# Patient Record
Sex: FEMALE | Race: BLACK OR AFRICAN AMERICAN | Employment: OTHER | URBAN - METROPOLITAN AREA
[De-identification: names, ages, dates, MRNs, and addresses within clinical notes are randomized per-mention and may not be internally consistent; named-entity substitution may affect disease eponyms.]

---

## 2017-01-22 RX ORDER — FUROSEMIDE 40 MG/1
TABLET ORAL
Qty: 30 TAB | Refills: 0 | Status: SHIPPED | OUTPATIENT
Start: 2017-01-22 | End: 2017-02-18 | Stop reason: SDUPTHER

## 2017-02-20 RX ORDER — FUROSEMIDE 40 MG/1
TABLET ORAL
Qty: 30 TAB | Refills: 0 | Status: SHIPPED | OUTPATIENT
Start: 2017-02-20 | End: 2017-03-20 | Stop reason: SDUPTHER

## 2017-02-26 RX ORDER — BLOOD SUGAR DIAGNOSTIC
STRIP MISCELLANEOUS
Qty: 100 STRIP | Refills: 9 | Status: SHIPPED | OUTPATIENT
Start: 2017-02-26 | End: 2018-04-01 | Stop reason: SDUPTHER

## 2017-03-20 RX ORDER — FUROSEMIDE 40 MG/1
TABLET ORAL
Qty: 30 TAB | Refills: 0 | Status: SHIPPED | OUTPATIENT
Start: 2017-03-20 | End: 2017-04-17 | Stop reason: SDUPTHER

## 2017-04-17 RX ORDER — FUROSEMIDE 40 MG/1
TABLET ORAL
Qty: 90 TAB | Refills: 3 | Status: SHIPPED | OUTPATIENT
Start: 2017-04-17 | End: 2017-04-18 | Stop reason: SDUPTHER

## 2017-04-17 NOTE — TELEPHONE ENCOUNTER
Requested Prescriptions     Pending Prescriptions Disp Refills    furosemide (LASIX) 40 mg tablet 90 Tab 3     Sig: TAKE ONE TABLET BY MOUTH DAILY     90 day supply requested by pharmacy

## 2017-04-18 RX ORDER — FUROSEMIDE 40 MG/1
TABLET ORAL
Qty: 30 TAB | Refills: 0 | Status: SHIPPED | OUTPATIENT
Start: 2017-04-18 | End: 2018-05-08 | Stop reason: SDUPTHER

## 2017-05-12 RX ORDER — DILTIAZEM HYDROCHLORIDE 120 MG/1
CAPSULE, EXTENDED RELEASE ORAL
Qty: 90 CAP | Refills: 2 | Status: SHIPPED | OUTPATIENT
Start: 2017-05-12 | End: 2018-02-03 | Stop reason: SDUPTHER

## 2017-06-26 RX ORDER — GLIPIZIDE 5 MG/1
TABLET ORAL
Qty: 180 TAB | Refills: 0 | Status: SHIPPED | OUTPATIENT
Start: 2017-06-26 | End: 2017-09-22 | Stop reason: SDUPTHER

## 2017-09-22 RX ORDER — GLIPIZIDE 5 MG/1
TABLET ORAL
Qty: 180 TAB | Refills: 0 | Status: SHIPPED | OUTPATIENT
Start: 2017-09-22 | End: 2017-12-18 | Stop reason: SDUPTHER

## 2017-10-31 ENCOUNTER — CLINICAL SUPPORT (OUTPATIENT)
Dept: INTERNAL MEDICINE CLINIC | Age: 82
End: 2017-10-31

## 2017-10-31 ENCOUNTER — LAB ONLY (OUTPATIENT)
Dept: INTERNAL MEDICINE CLINIC | Age: 82
End: 2017-10-31

## 2017-10-31 ENCOUNTER — HOSPITAL ENCOUNTER (OUTPATIENT)
Dept: LAB | Age: 82
Discharge: HOME | End: 2017-10-31
Payer: MEDICARE

## 2017-10-31 DIAGNOSIS — E11.9 TYPE 2 DIABETES MELLITUS WITHOUT COMPLICATION, WITHOUT LONG-TERM CURRENT USE OF INSULIN (HCC): Primary | Chronic | ICD-10-CM

## 2017-10-31 DIAGNOSIS — Z23 ENCOUNTER FOR IMMUNIZATION: ICD-10-CM

## 2017-10-31 DIAGNOSIS — I10 ESSENTIAL HYPERTENSION: Chronic | ICD-10-CM

## 2017-10-31 DIAGNOSIS — E11.9 TYPE 2 DIABETES MELLITUS WITHOUT COMPLICATION, WITHOUT LONG-TERM CURRENT USE OF INSULIN (HCC): Chronic | ICD-10-CM

## 2017-10-31 LAB
ALBUMIN SERPL-MCNC: 3.4 G/DL (ref 3.4–5)
ALBUMIN/GLOB SERPL: 0.8 {RATIO} (ref 0.8–1.7)
ALP SERPL-CCNC: 154 U/L (ref 45–117)
ALT SERPL-CCNC: 17 U/L (ref 13–56)
ANION GAP SERPL CALC-SCNC: 8 MMOL/L (ref 3–18)
AST SERPL-CCNC: 12 U/L (ref 15–37)
BASOPHILS # BLD: 0 K/UL (ref 0–0.06)
BASOPHILS NFR BLD: 0 % (ref 0–2)
BILIRUB SERPL-MCNC: 0.3 MG/DL (ref 0.2–1)
BUN SERPL-MCNC: 18 MG/DL (ref 7–18)
BUN/CREAT SERPL: 16 (ref 12–20)
CALCIUM SERPL-MCNC: 9 MG/DL (ref 8.5–10.1)
CHLORIDE SERPL-SCNC: 101 MMOL/L (ref 100–108)
CO2 SERPL-SCNC: 29 MMOL/L (ref 21–32)
CREAT SERPL-MCNC: 1.1 MG/DL (ref 0.6–1.3)
DIFFERENTIAL METHOD BLD: ABNORMAL
EOSINOPHIL # BLD: 0.1 K/UL (ref 0–0.4)
EOSINOPHIL NFR BLD: 2 % (ref 0–5)
ERYTHROCYTE [DISTWIDTH] IN BLOOD BY AUTOMATED COUNT: 14.6 % (ref 11.6–14.5)
EST. AVERAGE GLUCOSE BLD GHB EST-MCNC: 197 MG/DL
GLOBULIN SER CALC-MCNC: 4.4 G/DL (ref 2–4)
GLUCOSE SERPL-MCNC: 184 MG/DL (ref 74–99)
HBA1C MFR BLD: 8.5 % (ref 4.2–5.6)
HCT VFR BLD AUTO: 38.2 % (ref 35–45)
HGB BLD-MCNC: 11.9 G/DL (ref 12–16)
LYMPHOCYTES # BLD: 2.3 K/UL (ref 0.9–3.6)
LYMPHOCYTES NFR BLD: 27 % (ref 21–52)
MCH RBC QN AUTO: 29 PG (ref 24–34)
MCHC RBC AUTO-ENTMCNC: 31.2 G/DL (ref 31–37)
MCV RBC AUTO: 92.9 FL (ref 74–97)
MONOCYTES # BLD: 0.4 K/UL (ref 0.05–1.2)
MONOCYTES NFR BLD: 5 % (ref 3–10)
NEUTS SEG # BLD: 5.6 K/UL (ref 1.8–8)
NEUTS SEG NFR BLD: 66 % (ref 40–73)
PLATELET # BLD AUTO: 288 K/UL (ref 135–420)
PMV BLD AUTO: 11.2 FL (ref 9.2–11.8)
POTASSIUM SERPL-SCNC: 4.2 MMOL/L (ref 3.5–5.5)
PROT SERPL-MCNC: 7.8 G/DL (ref 6.4–8.2)
RBC # BLD AUTO: 4.11 M/UL (ref 4.2–5.3)
SODIUM SERPL-SCNC: 138 MMOL/L (ref 136–145)
WBC # BLD AUTO: 8.5 K/UL (ref 4.6–13.2)

## 2017-10-31 PROCEDURE — 80053 COMPREHEN METABOLIC PANEL: CPT | Performed by: INTERNAL MEDICINE

## 2017-10-31 PROCEDURE — 83036 HEMOGLOBIN GLYCOSYLATED A1C: CPT | Performed by: INTERNAL MEDICINE

## 2017-10-31 PROCEDURE — 85025 COMPLETE CBC W/AUTO DIFF WBC: CPT | Performed by: INTERNAL MEDICINE

## 2017-11-18 ENCOUNTER — TELEPHONE (OUTPATIENT)
Dept: INTERNAL MEDICINE CLINIC | Age: 82
End: 2017-11-18

## 2017-11-18 RX ORDER — CIPROFLOXACIN 250 MG/1
TABLET, FILM COATED ORAL
Qty: 10 TAB | Refills: 0 | Status: SHIPPED | OUTPATIENT
Start: 2017-11-18 | End: 2022-06-23 | Stop reason: ALTCHOICE

## 2017-12-18 RX ORDER — GLIPIZIDE 5 MG/1
TABLET ORAL
Qty: 180 TAB | Refills: 0 | Status: SHIPPED | OUTPATIENT
Start: 2017-12-18 | End: 2018-03-14 | Stop reason: SDUPTHER

## 2018-02-03 RX ORDER — DILTIAZEM HYDROCHLORIDE 120 MG/1
CAPSULE, EXTENDED RELEASE ORAL
Qty: 90 CAP | Refills: 1 | Status: SHIPPED | OUTPATIENT
Start: 2018-02-03 | End: 2018-08-02 | Stop reason: SDUPTHER

## 2018-03-14 RX ORDER — GLIPIZIDE 5 MG/1
TABLET ORAL
Qty: 180 TAB | Refills: 0 | Status: SHIPPED | OUTPATIENT
Start: 2018-03-14 | End: 2018-06-10 | Stop reason: SDUPTHER

## 2018-04-01 RX ORDER — BLOOD SUGAR DIAGNOSTIC
STRIP MISCELLANEOUS
Qty: 100 STRIP | Refills: 8 | Status: SHIPPED | OUTPATIENT
Start: 2018-04-01

## 2018-05-07 ENCOUNTER — TELEPHONE (OUTPATIENT)
Dept: INTERNAL MEDICINE CLINIC | Age: 83
End: 2018-05-07

## 2018-05-07 NOTE — TELEPHONE ENCOUNTER
Patient has a tooth abscess and would like for Dr Rikki Quevedo to send in antibiotic for her. He usually does home visits for her. Please send to [facility] on [address].

## 2018-05-08 RX ORDER — FUROSEMIDE 40 MG/1
TABLET ORAL
Qty: 30 TAB | Refills: 0 | Status: SHIPPED | OUTPATIENT
Start: 2018-05-08 | End: 2018-06-03 | Stop reason: SDUPTHER

## 2018-05-08 RX ORDER — AMOXICILLIN 500 MG/1
500 CAPSULE ORAL 3 TIMES DAILY
Qty: 30 CAP | Refills: 0 | Status: SHIPPED | OUTPATIENT
Start: 2018-05-08 | End: 2018-05-18

## 2018-06-03 RX ORDER — FUROSEMIDE 40 MG/1
TABLET ORAL
Qty: 30 TAB | Refills: 0 | Status: SHIPPED | OUTPATIENT
Start: 2018-06-03 | End: 2018-07-03 | Stop reason: SDUPTHER

## 2018-06-10 RX ORDER — GLIPIZIDE 5 MG/1
TABLET ORAL
Qty: 180 TAB | Refills: 0 | Status: SHIPPED | OUTPATIENT
Start: 2018-06-10 | End: 2018-09-03 | Stop reason: SDUPTHER

## 2018-07-03 RX ORDER — FUROSEMIDE 40 MG/1
TABLET ORAL
Qty: 30 TAB | Refills: 0 | Status: SHIPPED | OUTPATIENT
Start: 2018-07-03 | End: 2018-07-29 | Stop reason: SDUPTHER

## 2018-07-29 RX ORDER — FUROSEMIDE 40 MG/1
TABLET ORAL
Qty: 30 TAB | Refills: 0 | Status: SHIPPED | OUTPATIENT
Start: 2018-07-29 | End: 2018-08-29 | Stop reason: SDUPTHER

## 2018-08-02 RX ORDER — DILTIAZEM HYDROCHLORIDE 120 MG/1
CAPSULE, EXTENDED RELEASE ORAL
Qty: 90 CAP | Refills: 0 | Status: SHIPPED | OUTPATIENT
Start: 2018-08-02 | End: 2018-10-28 | Stop reason: SDUPTHER

## 2018-08-29 RX ORDER — FUROSEMIDE 40 MG/1
TABLET ORAL
Qty: 30 TAB | Refills: 0 | Status: SHIPPED | OUTPATIENT
Start: 2018-08-29 | End: 2018-09-25 | Stop reason: SDUPTHER

## 2018-09-03 RX ORDER — GLIPIZIDE 5 MG/1
TABLET ORAL
Qty: 180 TAB | Refills: 0 | Status: SHIPPED | OUTPATIENT
Start: 2018-09-03 | End: 2018-11-05 | Stop reason: SDUPTHER

## 2018-09-10 RX ORDER — METFORMIN HYDROCHLORIDE 750 MG/1
TABLET, EXTENDED RELEASE ORAL
Qty: 30 TAB | Refills: 3 | Status: SHIPPED | OUTPATIENT
Start: 2018-09-10 | End: 2019-05-06 | Stop reason: SDUPTHER

## 2018-09-13 RX ORDER — LANCETS
EACH MISCELLANEOUS
Qty: 100 EACH | Refills: 1 | Status: SHIPPED | OUTPATIENT
Start: 2018-09-13

## 2018-10-01 RX ORDER — FUROSEMIDE 40 MG/1
TABLET ORAL
Qty: 30 TAB | Refills: 0 | Status: SHIPPED | OUTPATIENT
Start: 2018-10-01 | End: 2018-10-27 | Stop reason: SDUPTHER

## 2018-10-28 RX ORDER — FUROSEMIDE 40 MG/1
TABLET ORAL
Qty: 30 TAB | Refills: 0 | Status: SHIPPED | OUTPATIENT
Start: 2018-10-28 | End: 2018-11-05 | Stop reason: SDUPTHER

## 2018-10-29 RX ORDER — DILTIAZEM HYDROCHLORIDE 120 MG/1
CAPSULE, EXTENDED RELEASE ORAL
Qty: 90 CAP | Refills: 0 | Status: SHIPPED | OUTPATIENT
Start: 2018-10-29 | End: 2019-01-04 | Stop reason: SDUPTHER

## 2018-11-01 ENCOUNTER — TELEPHONE (OUTPATIENT)
Dept: INTERNAL MEDICINE CLINIC | Age: 83
End: 2018-11-01

## 2018-11-01 NOTE — TELEPHONE ENCOUNTER
Patient stated Dr Eli Avila was going to come give her a flu shot. She was wondering when ?  Please call [phone]

## 2018-11-06 RX ORDER — GLIPIZIDE 5 MG/1
TABLET ORAL
Qty: 180 TAB | Refills: 0 | Status: SHIPPED | OUTPATIENT
Start: 2018-11-06 | End: 2019-02-03 | Stop reason: SDUPTHER

## 2018-11-06 RX ORDER — FUROSEMIDE 40 MG/1
TABLET ORAL
Qty: 9 TAB | Refills: 0 | Status: SHIPPED | OUTPATIENT
Start: 2018-11-06 | End: 2018-12-26 | Stop reason: SDUPTHER

## 2018-11-06 NOTE — TELEPHONE ENCOUNTER
Patient called again and wanted to know when Dr. Phillip Musa was coming to give her a flu injection. Dr. Phillip Musa stated he will try and go on Wednesday. Patient was told that he will try to come on Wednesday.

## 2018-11-07 ENCOUNTER — CLINICAL SUPPORT (OUTPATIENT)
Dept: INTERNAL MEDICINE CLINIC | Age: 83
End: 2018-11-07

## 2018-11-07 ENCOUNTER — HOSPITAL ENCOUNTER (OUTPATIENT)
Dept: LAB | Age: 83
Discharge: HOME | End: 2018-11-07
Payer: MEDICARE

## 2018-11-07 DIAGNOSIS — E11.9 TYPE 2 DIABETES MELLITUS WITHOUT COMPLICATION, WITHOUT LONG-TERM CURRENT USE OF INSULIN (HCC): Chronic | ICD-10-CM

## 2018-11-07 DIAGNOSIS — Z23 ENCOUNTER FOR IMMUNIZATION: ICD-10-CM

## 2018-11-07 DIAGNOSIS — I10 ESSENTIAL HYPERTENSION: Chronic | ICD-10-CM

## 2018-11-07 DIAGNOSIS — I10 ESSENTIAL HYPERTENSION: Primary | Chronic | ICD-10-CM

## 2018-11-07 LAB
ALBUMIN SERPL-MCNC: 3.7 G/DL (ref 3.4–5)
ALBUMIN/GLOB SERPL: 0.8 {RATIO} (ref 0.8–1.7)
ALP SERPL-CCNC: 105 U/L (ref 45–117)
ALT SERPL-CCNC: 19 U/L (ref 13–56)
ANION GAP SERPL CALC-SCNC: 7 MMOL/L (ref 3–18)
AST SERPL-CCNC: 14 U/L (ref 15–37)
BASOPHILS # BLD: 0 K/UL (ref 0–0.1)
BASOPHILS NFR BLD: 0 % (ref 0–2)
BILIRUB SERPL-MCNC: 0.6 MG/DL (ref 0.2–1)
BUN SERPL-MCNC: 13 MG/DL (ref 7–18)
BUN/CREAT SERPL: 12 (ref 12–20)
CALCIUM SERPL-MCNC: 9 MG/DL (ref 8.5–10.1)
CHLORIDE SERPL-SCNC: 105 MMOL/L (ref 100–108)
CO2 SERPL-SCNC: 28 MMOL/L (ref 21–32)
CREAT SERPL-MCNC: 1.05 MG/DL (ref 0.6–1.3)
DIFFERENTIAL METHOD BLD: ABNORMAL
EOSINOPHIL # BLD: 0.1 K/UL (ref 0–0.4)
EOSINOPHIL NFR BLD: 1 % (ref 0–5)
ERYTHROCYTE [DISTWIDTH] IN BLOOD BY AUTOMATED COUNT: 14.4 % (ref 11.6–14.5)
EST. AVERAGE GLUCOSE BLD GHB EST-MCNC: 214 MG/DL
GLOBULIN SER CALC-MCNC: 4.6 G/DL (ref 2–4)
GLUCOSE SERPL-MCNC: 168 MG/DL (ref 74–99)
HBA1C MFR BLD: 9.1 % (ref 4.2–5.6)
HCT VFR BLD AUTO: 37.6 % (ref 35–45)
HGB BLD-MCNC: 12.2 G/DL (ref 12–16)
LYMPHOCYTES # BLD: 3 K/UL (ref 0.9–3.6)
LYMPHOCYTES NFR BLD: 29 % (ref 21–52)
MCH RBC QN AUTO: 29.2 PG (ref 24–34)
MCHC RBC AUTO-ENTMCNC: 32.4 G/DL (ref 31–37)
MCV RBC AUTO: 90 FL (ref 74–97)
MONOCYTES # BLD: 0.5 K/UL (ref 0.05–1.2)
MONOCYTES NFR BLD: 5 % (ref 3–10)
NEUTS SEG # BLD: 6.5 K/UL (ref 1.8–8)
NEUTS SEG NFR BLD: 65 % (ref 40–73)
PLATELET # BLD AUTO: 339 K/UL (ref 135–420)
PMV BLD AUTO: 11.3 FL (ref 9.2–11.8)
POTASSIUM SERPL-SCNC: 4 MMOL/L (ref 3.5–5.5)
PROT SERPL-MCNC: 8.3 G/DL (ref 6.4–8.2)
RBC # BLD AUTO: 4.18 M/UL (ref 4.2–5.3)
SODIUM SERPL-SCNC: 140 MMOL/L (ref 136–145)
WBC # BLD AUTO: 10.1 K/UL (ref 4.6–13.2)

## 2018-11-07 PROCEDURE — 85025 COMPLETE CBC W/AUTO DIFF WBC: CPT | Performed by: INTERNAL MEDICINE

## 2018-11-07 PROCEDURE — 80053 COMPREHEN METABOLIC PANEL: CPT | Performed by: INTERNAL MEDICINE

## 2018-11-07 PROCEDURE — 83036 HEMOGLOBIN GLYCOSYLATED A1C: CPT | Performed by: INTERNAL MEDICINE

## 2018-11-07 NOTE — PROGRESS NOTES
Patient presented to office for Influenza 0.5 ml injection. Allergies noted. Patient well and consenting to injection. Injection given intramuscular in left deltoid. Patient tolerated injection well.

## 2018-12-27 RX ORDER — FUROSEMIDE 40 MG/1
TABLET ORAL
Qty: 90 TAB | Refills: 0 | Status: SHIPPED | OUTPATIENT
Start: 2018-12-27 | End: 2019-01-04 | Stop reason: SDUPTHER

## 2019-01-04 RX ORDER — FUROSEMIDE 40 MG/1
TABLET ORAL
Qty: 9 TAB | Refills: 0 | Status: SHIPPED | OUTPATIENT
Start: 2019-01-04 | End: 2022-08-04 | Stop reason: ALTCHOICE

## 2019-01-04 RX ORDER — DILTIAZEM HYDROCHLORIDE 120 MG/1
CAPSULE, EXTENDED RELEASE ORAL
Qty: 68 CAP | Refills: 0 | Status: SHIPPED | OUTPATIENT
Start: 2019-01-04 | End: 2019-03-09 | Stop reason: SDUPTHER

## 2019-02-03 RX ORDER — GLIPIZIDE 5 MG/1
TABLET ORAL
Qty: 180 TAB | Refills: 0 | Status: SHIPPED | OUTPATIENT
Start: 2019-02-03 | End: 2019-05-06 | Stop reason: SDUPTHER

## 2019-03-09 RX ORDER — DILTIAZEM HYDROCHLORIDE 120 MG/1
CAPSULE, EXTENDED RELEASE ORAL
Qty: 68 CAP | Refills: 0 | Status: SHIPPED | OUTPATIENT
Start: 2019-03-09 | End: 2019-05-06 | Stop reason: SDUPTHER

## 2019-03-26 RX ORDER — FUROSEMIDE 40 MG/1
TABLET ORAL
Qty: 90 TAB | Refills: 0 | Status: SHIPPED | OUTPATIENT
Start: 2019-03-26 | End: 2019-07-01 | Stop reason: SDUPTHER

## 2019-05-06 RX ORDER — GLIPIZIDE 5 MG/1
TABLET ORAL
Qty: 180 TAB | Refills: 0 | Status: SHIPPED | OUTPATIENT
Start: 2019-05-06 | End: 2019-08-16 | Stop reason: SDUPTHER

## 2019-05-06 RX ORDER — METFORMIN HYDROCHLORIDE 750 MG/1
TABLET, EXTENDED RELEASE ORAL
Qty: 90 TAB | Refills: 0 | Status: SHIPPED | OUTPATIENT
Start: 2019-05-06 | End: 2019-08-05 | Stop reason: SDUPTHER

## 2019-05-06 RX ORDER — DILTIAZEM HYDROCHLORIDE 120 MG/1
CAPSULE, EXTENDED RELEASE ORAL
Qty: 68 CAP | Refills: 0 | Status: SHIPPED | OUTPATIENT
Start: 2019-05-06 | End: 2019-07-14 | Stop reason: SDUPTHER

## 2019-06-07 ENCOUNTER — TELEPHONE (OUTPATIENT)
Dept: FAMILY MEDICINE CLINIC | Facility: CLINIC | Age: 84
End: 2019-06-07

## 2019-06-07 RX ORDER — AMOXICILLIN 500 MG/1
500 CAPSULE ORAL 3 TIMES DAILY
Qty: 30 CAP | Refills: 0 | Status: SHIPPED | OUTPATIENT
Start: 2019-06-07 | End: 2019-06-17

## 2019-07-01 RX ORDER — FUROSEMIDE 40 MG/1
TABLET ORAL
Qty: 90 TAB | Refills: 0 | Status: SHIPPED | OUTPATIENT
Start: 2019-07-01 | End: 2019-09-30 | Stop reason: SDUPTHER

## 2019-07-15 RX ORDER — DILTIAZEM HYDROCHLORIDE 120 MG/1
CAPSULE, EXTENDED RELEASE ORAL
Qty: 68 CAP | Refills: 0 | Status: SHIPPED | OUTPATIENT
Start: 2019-07-15 | End: 2019-09-30 | Stop reason: SDUPTHER

## 2019-08-05 RX ORDER — METFORMIN HYDROCHLORIDE 750 MG/1
TABLET, EXTENDED RELEASE ORAL
Qty: 90 TAB | Refills: 0 | Status: SHIPPED | OUTPATIENT
Start: 2019-08-05 | End: 2019-09-30 | Stop reason: SDUPTHER

## 2019-08-18 RX ORDER — GLIPIZIDE 5 MG/1
TABLET ORAL
Qty: 180 TAB | Refills: 0 | Status: SHIPPED | OUTPATIENT
Start: 2019-08-18 | End: 2019-10-01 | Stop reason: SDUPTHER

## 2019-09-30 RX ORDER — FUROSEMIDE 40 MG/1
TABLET ORAL
Qty: 90 TAB | Refills: 0 | Status: SHIPPED | OUTPATIENT
Start: 2019-09-30 | End: 2019-12-26

## 2019-09-30 RX ORDER — DILTIAZEM HYDROCHLORIDE 120 MG/1
CAPSULE, EXTENDED RELEASE ORAL
Qty: 68 CAP | Refills: 0 | Status: SHIPPED | OUTPATIENT
Start: 2019-09-30 | End: 2022-06-23

## 2019-09-30 RX ORDER — METFORMIN HYDROCHLORIDE 750 MG/1
TABLET, EXTENDED RELEASE ORAL
Qty: 55 TAB | Refills: 0 | Status: SHIPPED | OUTPATIENT
Start: 2019-09-30 | End: 2020-01-22 | Stop reason: SDUPTHER

## 2019-10-01 RX ORDER — METFORMIN HYDROCHLORIDE 750 MG/1
TABLET, EXTENDED RELEASE ORAL
Qty: 55 TAB | Refills: 0 | Status: SHIPPED | OUTPATIENT
Start: 2019-10-01 | End: 2020-01-22

## 2019-10-01 RX ORDER — DILTIAZEM HYDROCHLORIDE 120 MG/1
CAPSULE, EXTENDED RELEASE ORAL
Qty: 68 CAP | Refills: 0 | Status: SHIPPED | OUTPATIENT
Start: 2019-10-01 | End: 2019-12-19 | Stop reason: SDUPTHER

## 2019-10-01 RX ORDER — GLIPIZIDE 5 MG/1
TABLET ORAL
Qty: 88 TAB | Refills: 0 | Status: SHIPPED | OUTPATIENT
Start: 2019-10-01 | End: 2019-12-24

## 2019-10-01 RX ORDER — FUROSEMIDE 40 MG/1
TABLET ORAL
Qty: 90 TAB | Refills: 0 | Status: SHIPPED | OUTPATIENT
Start: 2019-10-01 | End: 2022-06-23 | Stop reason: SDUPTHER

## 2019-10-30 ENCOUNTER — CLINICAL SUPPORT (OUTPATIENT)
Dept: FAMILY MEDICINE CLINIC | Facility: CLINIC | Age: 84
End: 2019-10-30

## 2019-10-30 DIAGNOSIS — Z23 ENCOUNTER FOR IMMUNIZATION: ICD-10-CM

## 2019-10-31 NOTE — PATIENT INSTRUCTIONS
Patient presented to office 10/30/19 for influenza adjuvanted 0.5 ml injection. Allergies noted. Patient well and consenting to injection. Injection given intramuscular in right deltoid. Patient tolerated injection well and left office ambulatory. Patient presented to office 10/30/19 for pneumococcal 13   0.5 ml injection. Allergies noted. Patient well and consenting to injection. Injection given intramuscular in left deltoid. Patient tolerated injection well and left office ambulatory.

## 2019-12-19 RX ORDER — DILTIAZEM HYDROCHLORIDE 120 MG/1
CAPSULE, COATED, EXTENDED RELEASE ORAL
Qty: 90 CAP | Refills: 1 | Status: SHIPPED | OUTPATIENT
Start: 2019-12-19 | End: 2022-08-04 | Stop reason: SDUPTHER

## 2019-12-24 RX ORDER — GLIPIZIDE 5 MG/1
TABLET ORAL
Qty: 88 TAB | Refills: 0 | Status: SHIPPED | OUTPATIENT
Start: 2019-12-24 | End: 2022-06-23 | Stop reason: ALTCHOICE

## 2019-12-26 RX ORDER — FUROSEMIDE 40 MG/1
TABLET ORAL
Qty: 90 TAB | Refills: 0 | Status: SHIPPED | OUTPATIENT
Start: 2019-12-26 | End: 2019-12-30 | Stop reason: SDUPTHER

## 2019-12-30 RX ORDER — FUROSEMIDE 40 MG/1
TABLET ORAL
Qty: 90 TAB | Refills: 1 | Status: SHIPPED | OUTPATIENT
Start: 2019-12-30 | End: 2022-06-23 | Stop reason: SDUPTHER

## 2019-12-30 NOTE — TELEPHONE ENCOUNTER
Patient states Dalton Lizama told her someone picked up prescription, which she says no one picked up. She is requesting new prescription. She will also contact her insurance.     Requested Prescriptions     Pending Prescriptions Disp Refills    furosemide (LASIX) 40 mg tablet 90 Tab 0

## 2020-02-26 RX ORDER — GLIPIZIDE 5 MG/1
TABLET ORAL
Qty: 180 TAB | Refills: 0 | OUTPATIENT
Start: 2020-02-26

## 2020-07-31 ENCOUNTER — HOSPITAL ENCOUNTER (OUTPATIENT)
Dept: LAB | Age: 85
Discharge: HOME | End: 2020-07-31
Payer: MEDICARE

## 2020-07-31 ENCOUNTER — HOSPITAL ENCOUNTER (OUTPATIENT)
Dept: LAB | Age: 85
Discharge: HOME | End: 2020-07-31

## 2020-07-31 DIAGNOSIS — I10 ESSENTIAL HYPERTENSION, MALIGNANT: ICD-10-CM

## 2020-07-31 DIAGNOSIS — E11.9 TYPE 2 DIABETES MELLITUS WITHOUT COMPLICATION, WITH LONG-TERM CURRENT USE OF INSULIN (HCC): ICD-10-CM

## 2020-07-31 DIAGNOSIS — I51.89 HYPERKINETIC HEART DISEASE: ICD-10-CM

## 2020-07-31 DIAGNOSIS — Z79.4 TYPE 2 DIABETES MELLITUS WITHOUT COMPLICATION, WITH LONG-TERM CURRENT USE OF INSULIN (HCC): ICD-10-CM

## 2020-07-31 LAB
ATRIAL RATE: 89 BPM
CALCULATED P AXIS, ECG09: 54 DEGREES
CALCULATED R AXIS, ECG10: -30 DEGREES
CALCULATED T AXIS, ECG11: 34 DEGREES
DIAGNOSIS, 93000: NORMAL
P-R INTERVAL, ECG05: 172 MS
Q-T INTERVAL, ECG07: 356 MS
QRS DURATION, ECG06: 96 MS
QTC CALCULATION (BEZET), ECG08: 433 MS
VENTRICULAR RATE, ECG03: 89 BPM
XX-LABCORP SPECIMEN COL,LCBCF: NORMAL

## 2020-07-31 PROCEDURE — 93005 ELECTROCARDIOGRAM TRACING: CPT

## 2020-07-31 PROCEDURE — 99001 SPECIMEN HANDLING PT-LAB: CPT

## 2020-09-04 ENCOUNTER — HOSPITAL ENCOUNTER (OUTPATIENT)
Dept: NON INVASIVE DIAGNOSTICS | Age: 85
Discharge: HOME | End: 2020-09-04
Attending: FAMILY MEDICINE
Payer: MEDICARE

## 2020-09-04 ENCOUNTER — HOSPITAL ENCOUNTER (OUTPATIENT)
Dept: LAB | Age: 85
Discharge: HOME | End: 2020-09-04
Payer: MEDICARE

## 2020-09-04 VITALS
HEIGHT: 66 IN | DIASTOLIC BLOOD PRESSURE: 82 MMHG | BODY MASS INDEX: 47.09 KG/M2 | WEIGHT: 293 LBS | SYSTOLIC BLOOD PRESSURE: 148 MMHG

## 2020-09-04 DIAGNOSIS — I51.89 DIASTOLIC DYSFUNCTION: ICD-10-CM

## 2020-09-04 DIAGNOSIS — I10 HTN (HYPERTENSION): ICD-10-CM

## 2020-09-04 LAB
ANION GAP SERPL CALC-SCNC: 7 MMOL/L (ref 3–18)
BUN SERPL-MCNC: 14 MG/DL (ref 7–18)
BUN/CREAT SERPL: 13 (ref 12–20)
CALCIUM SERPL-MCNC: 9.6 MG/DL (ref 8.5–10.1)
CHLORIDE SERPL-SCNC: 103 MMOL/L (ref 100–111)
CO2 SERPL-SCNC: 27 MMOL/L (ref 21–32)
CREAT SERPL-MCNC: 1.06 MG/DL (ref 0.6–1.3)
ECHO AO ROOT DIAM: 2.72 CM
ECHO LV INTERNAL DIMENSION DIASTOLIC: 4.39 CM (ref 3.9–5.3)
ECHO LV INTERNAL DIMENSION SYSTOLIC: 3.88 CM
ECHO LV IVSD: 1.09 CM (ref 0.6–0.9)
ECHO LV MASS 2D: 156.6 G (ref 67–162)
ECHO LV MASS INDEX 2D: 63.7 G/M2 (ref 43–95)
ECHO LV POSTERIOR WALL DIASTOLIC: 1 CM (ref 0.6–0.9)
ECHO LVOT DIAM: 2.03 CM
ECHO LVOT PEAK GRADIENT: 1.66 MMHG
ECHO LVOT PEAK VELOCITY: 64.47 CM/S
ECHO LVOT SV: 35.3 ML
ECHO LVOT VTI: 10.95 CM
ECHO MV A VELOCITY: 97.46 CM/S
ECHO MV E DECELERATION TIME (DT): 0.12 S
ECHO MV E VELOCITY: 59.71 CM/S
ECHO MV E/A RATIO: 0.61
ECHO TV REGURGITANT MAX VELOCITY: 268.12 CM/S
ECHO TV REGURGITANT PEAK GRADIENT: 28.76 MMHG
GLUCOSE SERPL-MCNC: 148 MG/DL (ref 74–99)
LVOT MG: 0.68 MMHG
POTASSIUM SERPL-SCNC: 3.9 MMOL/L (ref 3.5–5.5)
SODIUM SERPL-SCNC: 137 MMOL/L (ref 136–145)

## 2020-09-04 PROCEDURE — 80048 BASIC METABOLIC PNL TOTAL CA: CPT

## 2020-09-04 PROCEDURE — 36415 COLL VENOUS BLD VENIPUNCTURE: CPT

## 2020-09-04 PROCEDURE — 93306 TTE W/DOPPLER COMPLETE: CPT

## 2021-05-16 NOTE — TELEPHONE ENCOUNTER
Patient has appointment 4/3.     Requested Prescriptions     Pending Prescriptions Disp Refills    glipiZIDE (GLUCOTROL) 5 mg tablet 88 Tab 0 None

## 2021-07-24 ENCOUNTER — HOSPITAL ENCOUNTER (OUTPATIENT)
Dept: LAB | Age: 86
Discharge: HOME | End: 2021-07-24

## 2021-07-24 LAB — XX-LABCORP SPECIMEN COL,LCBCF: NORMAL

## 2021-07-24 PROCEDURE — 99001 SPECIMEN HANDLING PT-LAB: CPT

## 2022-01-26 ENCOUNTER — TRANSCRIBE ORDER (OUTPATIENT)
Dept: SCHEDULING | Age: 87
End: 2022-01-26

## 2022-01-26 DIAGNOSIS — Z91.81 PERSONAL HISTORY OF FALL: Primary | ICD-10-CM

## 2022-02-17 ENCOUNTER — TRANSCRIBE ORDER (OUTPATIENT)
Dept: SCHEDULING | Age: 87
End: 2022-02-17

## 2022-02-17 DIAGNOSIS — M85.88 OTHER SPECIFIED DISORDERS OF BONE DENSITY AND STRUCTURE, OTHER SITE: Primary | ICD-10-CM

## 2022-05-09 ENCOUNTER — HOSPITAL ENCOUNTER (OUTPATIENT)
Dept: LAB | Age: 87
Discharge: HOME | End: 2022-05-09

## 2022-05-09 PROCEDURE — 99001 SPECIMEN HANDLING PT-LAB: CPT

## 2022-05-14 LAB — XX-LABCORP SPECIMEN COL,LCBCF: NORMAL

## 2022-05-17 ENCOUNTER — TELEPHONE (OUTPATIENT)
Dept: INTERNAL MEDICINE CLINIC | Age: 87
End: 2022-05-17

## 2022-05-17 NOTE — TELEPHONE ENCOUNTER
----- Message from [employer] sent at 2022  4:28 PM EDT -----  Subject: Appointment Request    Reason for Call: New Patient Request Appointment    QUESTIONS  Type of Appointment? Established Patient  Reason for appointment request? No appointments available during search  Additional Information for Provider? pt needs to be scheduled  ---------------------------------------------------------------------------  --------------  CALL BACK INFO  What is the best way for the office to contact you? OK to leave message on   voicemail  Preferred Call Back Phone Number? [phone]  ---------------------------------------------------------------------------  --------------  SCRIPT ANSWERS  Relationship to Patient? Self  Specialty Confirmation? Primary Care  Is this the first appointment to establish care for a ? No  Have you been diagnosed with, awaiting test results for, or told that you   are suspected of having COVID-19 (Coronavirus)? (If patient has tested   negative or was tested as a requirement for work, school, or travel and   not based on symptoms, answer no)? No  Within the past 10 days have you developed any of the following symptoms   (answer no if symptoms have been present longer than 10 days or began   more than 10 days ago)? Fever or Chills, Cough, Shortness of breath or   difficulty breathing, Loss of taste or smell, Sore throat, Nasal   congestion, Sneezing or runny nose, Fatigue or generalized body aches   (answer no if pain is specific to a body part e.g. back pain), Diarrhea,   Headache? No  Have you had close contact with someone with COVID-19 in the last 7 days? No  (Service Expert  click yes below to proceed with [employer] Hotline As Usual   Scheduling)?  Yes

## 2022-05-17 NOTE — TELEPHONE ENCOUNTER
Tried to call pt to schedule appt w/Dr. Cornelio Alvarado, no answer, left voice message for pt to call back.

## 2022-06-08 ENCOUNTER — TRANSCRIBE ORDER (OUTPATIENT)
Dept: SCHEDULING | Age: 87
End: 2022-06-08

## 2022-06-08 DIAGNOSIS — R41.82 ALTERED MENTAL STATUS: Primary | ICD-10-CM

## 2022-06-09 ENCOUNTER — TRANSCRIBE ORDER (OUTPATIENT)
Dept: SCHEDULING | Age: 87
End: 2022-06-09

## 2022-06-09 DIAGNOSIS — R41.82 ALTERED MENTAL STATUS: Primary | ICD-10-CM

## 2022-06-16 ENCOUNTER — TRANSCRIBE ORDER (OUTPATIENT)
Dept: SCHEDULING | Age: 87
End: 2022-06-16

## 2022-06-16 DIAGNOSIS — I50.32 CHRONIC DIASTOLIC HEART FAILURE (HCC): Primary | ICD-10-CM

## 2022-06-16 DIAGNOSIS — I50.32 CHRONIC DIASTOLIC HEART FAILURE WITH PRESERVED EJECTION FRACTION (HCC): Primary | ICD-10-CM

## 2022-06-23 ENCOUNTER — OFFICE VISIT (OUTPATIENT)
Dept: INTERNAL MEDICINE CLINIC | Age: 87
End: 2022-06-23
Payer: MEDICARE

## 2022-06-23 VITALS
OXYGEN SATURATION: 98 % | SYSTOLIC BLOOD PRESSURE: 133 MMHG | HEART RATE: 80 BPM | RESPIRATION RATE: 20 BRPM | HEIGHT: 66 IN | TEMPERATURE: 96.7 F | DIASTOLIC BLOOD PRESSURE: 74 MMHG | BODY MASS INDEX: 52.46 KG/M2

## 2022-06-23 DIAGNOSIS — R21 RASH: ICD-10-CM

## 2022-06-23 DIAGNOSIS — Z91.81 AT HIGH RISK FOR FALLS: ICD-10-CM

## 2022-06-23 DIAGNOSIS — I48.0 PAROXYSMAL A-FIB (HCC): ICD-10-CM

## 2022-06-23 DIAGNOSIS — Z78.9 IMPAIRED MOBILITY AND ADLS: ICD-10-CM

## 2022-06-23 DIAGNOSIS — F41.9 ANXIETY: ICD-10-CM

## 2022-06-23 DIAGNOSIS — N17.9 ACUTE RENAL FAILURE SUPERIMPOSED ON STAGE 3 CHRONIC KIDNEY DISEASE, UNSPECIFIED ACUTE RENAL FAILURE TYPE, UNSPECIFIED WHETHER STAGE 3A OR 3B CKD (HCC): ICD-10-CM

## 2022-06-23 DIAGNOSIS — R41.9 UNSPECIFIED SYMPTOMS AND SIGNS INVOLVING COGNITIVE FUNCTIONS AND AWARENESS: ICD-10-CM

## 2022-06-23 DIAGNOSIS — Z74.09 IMPAIRED MOBILITY AND ADLS: ICD-10-CM

## 2022-06-23 DIAGNOSIS — B37.2 CANDIDAL INTERTRIGO: ICD-10-CM

## 2022-06-23 DIAGNOSIS — E11.29 TYPE 2 DIABETES MELLITUS WITH MICROALBUMINURIA, WITHOUT LONG-TERM CURRENT USE OF INSULIN (HCC): ICD-10-CM

## 2022-06-23 DIAGNOSIS — R41.0 DELIRIUM: Primary | ICD-10-CM

## 2022-06-23 DIAGNOSIS — R80.9 TYPE 2 DIABETES MELLITUS WITH MICROALBUMINURIA, WITHOUT LONG-TERM CURRENT USE OF INSULIN (HCC): ICD-10-CM

## 2022-06-23 DIAGNOSIS — R53.81 PHYSICAL DECONDITIONING: ICD-10-CM

## 2022-06-23 DIAGNOSIS — E87.1 HYPONATREMIA: ICD-10-CM

## 2022-06-23 DIAGNOSIS — R54 FRAILTY SYNDROME IN GERIATRIC PATIENT: ICD-10-CM

## 2022-06-23 DIAGNOSIS — N18.30 ACUTE RENAL FAILURE SUPERIMPOSED ON STAGE 3 CHRONIC KIDNEY DISEASE, UNSPECIFIED ACUTE RENAL FAILURE TYPE, UNSPECIFIED WHETHER STAGE 3A OR 3B CKD (HCC): ICD-10-CM

## 2022-06-23 PROCEDURE — 93000 ELECTROCARDIOGRAM COMPLETE: CPT | Performed by: FAMILY MEDICINE

## 2022-06-23 PROCEDURE — 99215 OFFICE O/P EST HI 40 MIN: CPT | Performed by: FAMILY MEDICINE

## 2022-06-23 PROCEDURE — 1123F ACP DISCUSS/DSCN MKR DOCD: CPT | Performed by: FAMILY MEDICINE

## 2022-06-23 RX ORDER — SERTRALINE HYDROCHLORIDE 25 MG/1
25 TABLET, FILM COATED ORAL DAILY
Qty: 90 TABLET | Refills: 0 | Status: SHIPPED | OUTPATIENT
Start: 2022-06-23 | End: 2022-09-01 | Stop reason: SDUPTHER

## 2022-06-23 RX ORDER — HYDROCORTISONE 1 %
CREAM (GRAM) TOPICAL 2 TIMES DAILY
Qty: 30 G | Refills: 0 | Status: SHIPPED | OUTPATIENT
Start: 2022-06-23 | End: 2022-07-14 | Stop reason: SDUPTHER

## 2022-06-23 RX ORDER — OLANZAPINE 10 MG/1
10 TABLET ORAL
COMMUNITY
End: 2022-06-23 | Stop reason: ALTCHOICE

## 2022-06-23 RX ORDER — QUETIAPINE FUMARATE 25 MG/1
TABLET, FILM COATED ORAL
Qty: 30 TABLET | Refills: 1 | Status: SHIPPED | OUTPATIENT
Start: 2022-06-23 | End: 2022-08-04 | Stop reason: SDUPTHER

## 2022-06-23 RX ORDER — CETIRIZINE HCL 10 MG
10 TABLET ORAL DAILY
COMMUNITY

## 2022-06-23 RX ORDER — PRAVASTATIN SODIUM 20 MG/1
20 TABLET ORAL
COMMUNITY
End: 2022-07-14 | Stop reason: SDUPTHER

## 2022-06-23 RX ORDER — CHLORPHENIRAMINE MALEATE 4 MG
TABLET ORAL 2 TIMES DAILY
Qty: 15 G | Refills: 0 | Status: SHIPPED | OUTPATIENT
Start: 2022-06-23 | End: 2022-07-14 | Stop reason: SDUPTHER

## 2022-06-23 RX ORDER — ACETAMINOPHEN 500 MG
2000 TABLET ORAL DAILY
COMMUNITY
End: 2022-11-02

## 2022-06-23 NOTE — PROGRESS NOTES
Recently in [facility] in May for UTI and Afib. Patient is not on anything for the A-Fib? States may be a risk for Eliquis. Verbal order from Megan Ureña MD to order labs/sign and draw them in office    Labs were drawn and sent to [facility] by Cierra Nuñez LPN:    The following tubes were sent:    1 Lavendar, 0 Red, 2 SST, 0 Urine    Draw site right antecubital.  Patient tolerated draw with no distress. Kary Leonard presents today for   Chief Complaint   [facility]   [email or web address]       Is someone accompanying this pt?yes, family members  Is the patient using any DME equipment during [address]? Yes, wheelchair    Depression Screening:  3 most recent PHQ Screens 6/23/2022   Little interest or pleasure in doing things Not at all   Feeling down, depressed, irritable, or hopeless Not at all   Total Score PHQ 2 0       Learning Assessment:  No flowsheet data found. Fall Risk  Fall Risk Assessment, last 12 mths 6/23/2022   Able to walk? Yes   Fall in past 12 months? 1   Do you feel unsteady? 1   Are you worried about falling 0   Is TUG test greater than 12 seconds? 1   Is the gait abnormal? 1   Number of falls in past 12 months 2   Fall with injury? 0       ADL  ADL Assessment 6/23/2022   Feeding yourself No Help Needed   Getting from bed to chair Help Needed   Getting dressed Help Needed   Bathing or showering Help Needed   Walk across the room (includes cane/walker) Help Needed   Using the telphone No Help Needed   Taking your medications Help Needed   Preparing meals Help Needed   Managing money (expenses/bills) Help Needed   Moderately strenuous housework (laundry) Help Needed   Shopping for personal items (toiletries/medicines) Help Needed   Shopping for groceries Help Needed   Driving Help Needed   Climbing a flight of stairs Help Needed   Getting to places beyond walking distances Help Needed       Health Maintenance reviewed and discussed and ordered per Provider.     Health Maintenance Due Topic Date Due    Depression Screen  Never done    Foot Exam Q1  Never done    MICROALBUMIN Q1  Never done    Eye Exam Retinal or Dilated  Never done    DTaP/Tdap/Td series (1 - Tdap) Never done    Shingrix Vaccine Age 50> (1 of 2) Never done    Bone Densitometry (Dexa) Screening  Never done    Medicare Yearly Exam  Never done    Pneumococcal 65+ years (2 - PPSV23 or PCV20) 10/30/2020   . Coordination of Care:  1. \"Have you been to the ER, urgent care clinic since your last visit? Hospitalized since your last visit? \" Yes Where: [facility]    2. \"Have you seen or consulted any other health care providers outside of the [address] [place] [address] since your last visit? \" Yes Where: Podiatry     3. For patients aged 39-70: Has the patient had a colonoscopy? NA - based on age     If the patient is female:    4. For patients aged 41-77: Has the patient had a mammogram within the past 2 years? NA - based on age    11. For patients aged 21-65: Has the patient had a pap smear?  NA - based on age

## 2022-06-23 NOTE — PROGRESS NOTES
Thea Minor (: 1933) is a 80 y.o. female here for evaluation of the following chief concerns(s):  Establish Care  Chronic condition management    ASSESSMENT/PLAN:  1. Delirium  Overall, Mrs. Randa Parker has had significant overall clinical decline since the last time that I saw her approximately 3 months ago, most notably in regards to her cognition. The differential diagnosis for her cognitive changes may include residual delirium related to recent urinary tract infection, hyponatremia, ELIZABETH as well as environmental changes being hospitalized, medication side effect as she is taking neuroleptic. I am also concerned that she may have had a stroke given left eye ptosis, paroxysmal A. Fib. MRI scheduled for next week. -     VITAMIN B12  -     sertraline (ZOLOFT) 25 mg tablet; Take 1 Tablet by mouth daily. , Normal, Disp-90 Tablet, R-0  -     QUEtiapine (SEROquel) 25 mg tablet; Half to one tablet QHS PRN agitation. , Normal, Disp-30 Tablet, R-1    2. Anxiety  To help with her symptoms of anxiety, will start low-dose sertraline, to help reduce risk for side effects we will discontinue olanzapine and start low-dose as needed quetiapine at night-family is aware of black box warning for all cause mortality in older adults with cognitive impairment. -     sertraline (ZOLOFT) 25 mg tablet; Take 1 Tablet by mouth daily. , Normal, Disp-90 Tablet, R-0  -     QUEtiapine (SEROquel) 25 mg tablet; Half to one tablet QHS PRN agitation. , Normal, Disp-30 Tablet, R-1    3. Paroxysmal A-fib [facility])  Mrs. Randa Parker was found to be in A. fib today based on auscultation and EKG though there was significant baseline artifact limiting interpretation. We will update ECHO. Family declines full anticoagulation at this time with NOAC, opts for aspirin 162 mg daily as well as cardiology referral for further evaluation and management.    -     CBC WITH AUTOMATED DIFF  -     ECHO ADULT COMPLETE; Future  -     AMB POC EKG ROUTINE W/ 12 LEADS, INTER & REP  -     REFERRAL TO CARDIOLOGY    4. Acute renal failure superimposed on stage 3 chronic kidney disease, unspecified acute renal failure type, unspecified whether stage 3a or 3b CKD ([facility] [address]  We will update labs to recheck on her kidney function and electrolyte status.  -     METABOLIC PANEL, COMPREHENSIVE  -     MAGNESIUM  -     PHOSPHORUS    5. Hyponatremia  -     METABOLIC PANEL, COMPREHENSIVE  -     COLLECTION VENOUS BLOOD,VENIPUNCTURE    6. Type 2 diabetes mellitus with microalbuminuria, without long-term current use of insulin (HCC)  Recent hemoglobin A1c shows good diabetic control, we will discontinue her glipizide in the setting of advanced age and impaired renal function, trial of Ozempic to help with comorbid body mass index greater than 30 kg/m², hyperlipidemia and no need for renal adjustment. 7. Candidal intertrigo  On exam she has rash underneath breast folds consistent with candidal intertrigo we will treat with nystatin powder may alternate with clotrimazole topical cream.    -     nystatin (MYCOSTATIN) powder; Apply  to affected area three (3) times daily. , Normal, Disp-1 Each, R-1    8. Rash  Rash on right lower extremity appears to be contact dermatitis and possible superimposed fungal infection, trial of clotrimazole and close monitoring.    -     hydrocortisone (CORTAID) 1 % topical cream; Apply  to affected area two (2) times a day. use thin layer, Normal, Disp-30 g, R-0  -     clotrimazole (LOTRIMIN) 1 % topical cream; Apply  to affected area two (2) times a day., Normal, Disp-15 g, R-0    9. Impaired mobility and ADLs  I have ordered for home health for PT/OT to evaluate and treat for strength/balance/endurance training, fall risk reduction, need for DME, home health aide.    -     [address]    10. Frailty syndrome in geriatric patient  -     [facility]    11. Physical deconditioning  -     REFERRAL TO HOME HEALTH    12.  At high risk for falls  - REFERRAL TO HOME HEALTH     13. Unspecified symptoms and signs involving cognitive functions and awareness   -     VITAMIN B12    We will plan for close follow-up in 1 month, telemetry medicine may be utilized giving taxing effort for her to leave the home. Also, I advised Dr. Christine Munguia that for continued deterioration family may need to initiate advance care planning/goals of care discussion. Mable Wing agrees with plan as above and has no additional questions at this time. SUBJECTIVE/OBJECTIVE:  Mrs. Sampson Vaz presents to clinic accompanied by her grandson, Dr. Aura Mccartney, her daughter, and ; who assist with HPI. Recent hospitalization at [facility] for AMS presumed secondary to UTI, hyponatremia (Na 128) and ELIZABETH, PAF, concern for TIA vs CVA- pt was not able to tolerate MRI at the time 2/2 agitation. Lasix was decreased form 40mg to 20mh, and Losartan was discontinued. No discharge summary available for review at this time. MRI brain scheduled for Friday. No ECHO is scheduled. Agitation between 5-8PM.  Discharged her on Olanzapine 10mg QHS; this seemed to help at first, added melatonin but persistent insomnia. Family hopeful HH for PT/OT/home health aid; order replaced. DM w/ nephropathy:  5/28/22 HA1C 8.0%  D/c on Glipizide, stopped after 3-4 days for BG to 80's. Had Podiatry visit last week, no neuropathy. Concern for Left eye- redness, swollen eyelid, improved some s/p Erythromycin. RLE rash, per family presumes related to SCDs. Rash under breasts. Unable to obtain accurate ROS secondary to cognitive impairment.        Past Medical History:   Diagnosis Date    Congestive heart failure (HCC)     Diabetes (HCC)     High cholesterol     Hypertension      Past Surgical History:   Procedure Laterality Date    HX HYSTERECTOMY  1979     Family History   Problem Relation Age of Onset    Heart Disease Mother     Diabetes Sister     Hypertension Sister    Carolyn Miranda Other Sister brain tumor       Social History     Socioeconomic History    Marital status:      Spouse name: Not on file    Number of children: Not on file    Years of education: Not on file    Highest education level: Not on file   Occupational History    Not on file   Tobacco Use    Smoking status: Never Smoker    Smokeless tobacco: Never Used   Vaping Use    Vaping Use: Never used   Substance and Sexual Activity    Alcohol use: No    Drug use: Never    Sexual activity: Not on file   Other Topics Concern    Not on file   Social History Narrative    Not on file     Social Determinants of Health     Financial Resource Strain:     Difficulty of Paying Living Expenses: Not on file   Food Insecurity:     Worried About Running Out of Food in the Last Year: Not on file    Eleazar of Food in the Last Year: Not on file   Transportation Needs:     Lack of Transportation (Medical): Not on file    Lack of Transportation (Non-Medical):  Not on file   Physical Activity:     Days of Exercise per Week: Not on file    Minutes of Exercise per Session: Not on file   Stress:     Feeling of Stress : Not on file   Social Connections:     Frequency of Communication with Friends and Family: Not on file    Frequency of Social Gatherings with Friends and Family: Not on file    Attends [employer] Services: Not on file    Active Member of [address] [place] [address] [employer] or Organizations: Not on file    Attends Club or Organization Meetings: Not on file    Marital Status: Not on file   Intimate Partner Violence:     Fear of Current or Ex-Partner: Not on file    Emotionally Abused: Not on file    Physically Abused: Not on file    Sexually Abused: Not on file   Housing Stability:     Unable to Pay for Housing in the Last Year: Not on file    Number of [place] in the Last Year: Not on file    Unstable Housing in the Last Year: Not on file     Social History     Tobacco Use   Smoking Status Never Smoker   Smokeless Tobacco Never Used Current Outpatient Medications   Medication Sig Dispense Refill    nystatin (MYCOSTATIN) powder Apply  to affected area three (3) times daily. 1 Each 1    cetirizine (Allergy Relief, cetirizine,) 10 mg tablet Take 10 mg by mouth daily.  cholecalciferol (Vitamin D3) (2,000 UNITS /50 MCG) cap capsule Take 2,000 Units by mouth daily.  pravastatin (PRAVACHOL) 20 mg tablet Take 20 mg by mouth nightly.  sertraline (ZOLOFT) 25 mg tablet Take 1 Tablet by mouth daily. 90 Tablet 0    QUEtiapine (SEROquel) 25 mg tablet Half to one tablet QHS PRN agitation. 30 Tablet 1    hydrocortisone (CORTAID) 1 % topical cream Apply  to affected area two (2) times a day. use thin layer 30 g 0    clotrimazole (LOTRIMIN) 1 % topical cream Apply  to affected area two (2) times a day. 15 g 0    dilTIAZem CD (CARTIA XT) 120 mg ER capsule TAKE ONE CAPSULE BY MOUTH DAILY 90 Cap 1    furosemide (LASIX) 40 mg tablet TAKE ONE TABLET BY MOUTH DAILY 9 Tab 0    ACCU-CHEK FASTCLIX LANCET DRUM mis USE ONE LANCET TO TEST TWICE A  Each 1    ACCU-CHEK SMARTVIEW TEST STRIP strip USE 1 STRIP TWO TIMES A DAY TO CHECK BLOOD SUGAR 100 Strip 8    glucose blood VI test strips (ACCU-CHEK SMARTVIEW TEST STRIP) strip Use to check blood glucose twice daily DX E11.9--For use with Accu-check jeff meter 100 Strip 11    Blood-Glucose Meter (ACCU-CHEK JEFF) misc Use to check glucose twice daily. Dx:E11.9 1 Each 0    glucose blood VI test strips (FREESTYLE TEST) strip Test blood sugar 2 times daily dx:E11.9 100 Strip 11     No Known Allergies    /74   Pulse 80   Temp (!) 96.7 °F (35.9 °C)   Resp 20   Ht 5' 6\" (1.676 m)   SpO2 98%   BMI 52.46 kg/m²     Physical Exam  Constitutional:       General: She is not in acute distress. Appearance: She is obese. She is ill-appearing. She is not toxic-appearing. HENT:      Head: Normocephalic and atraumatic.       Nose: Nose normal.      Mouth/Throat:      Mouth: Mucous membranes are moist.      Pharynx: Oropharynx is clear. Eyes:      Comments: Left upper eyelid w/ mild swelling, Left lower eye lid w/ mild eversion; ?mild ptosis. Cardiovascular:      Rate and Rhythm: Normal rate. Rhythm irregular. Heart sounds: Normal heart sounds. Pulmonary:      Effort: Pulmonary effort is normal.      Breath sounds: Normal breath sounds. Abdominal:      General: Bowel sounds are normal.      Palpations: Abdomen is soft. Tenderness: There is no abdominal tenderness. Musculoskeletal:      Right lower leg: Edema present. Left lower leg: Edema present. Skin:     Findings: Rash (RLE mild erythema-papular rash, masceration under breasts- bilat.) present. Neurological:      Mental Status: She is alert. She is disoriented. Cranial Nerves: No cranial nerve deficit (Exam limited by cognitive impairment). Psychiatric:      Comments: Flattened affect, able to answer yes-no questions but answers were inconsistent. Lab Results   Component Value Date/Time    WBC 6.8 06/23/2022 03:21 PM    HGB 11.6 06/23/2022 03:21 PM    HCT 32.8 (L) 06/23/2022 03:21 PM    PLATELET 086 71/26/4651 03:21 PM    MCV 84 06/23/2022 03:21 PM     Lab Results   Component Value Date/Time    Sodium 138 06/23/2022 03:21 PM    Potassium 4.0 06/23/2022 03:21 PM    Chloride 99 06/23/2022 03:21 PM    CO2 23 06/23/2022 03:21 PM    Anion gap 7 09/04/2020 03:25 PM    Glucose 126 (H) 06/23/2022 03:21 PM    BUN 13 06/23/2022 03:21 PM    Creatinine 1.11 (H) 06/23/2022 03:21 PM    BUN/Creatinine ratio 12 06/23/2022 03:21 PM    GFR est AA 60 (L) 09/04/2020 03:25 PM    GFR est non-AA 49 (L) 09/04/2020 03:25 PM    Calcium 10.3 06/23/2022 03:21 PM    Bilirubin, total 0.6 06/23/2022 03:21 PM    Alk.  phosphatase 82 06/23/2022 03:21 PM    Protein, total 7.4 06/23/2022 03:21 PM    Albumin 3.5 (L) 06/23/2022 03:21 PM    Globulin 4.6 (H) 11/07/2018 02:39 PM    A-G Ratio 0.9 (L) 06/23/2022 03:21 PM    ALT (SGPT) 23 06/23/2022 03:21 PM    AST (SGOT) 37 06/23/2022 03:21 PM     Lab Results   Component Value Date/Time    Cholesterol, total 119 09/17/2013 01:20 AM    HDL Cholesterol <10 (L) 09/17/2013 01:20 AM    LDL, calculated NOT CALCULATED DUE TO LOW HDLC LEVEL 09/17/2013 01:20 AM    VLDL, calculated  09/17/2013 01:20 AM     Calculation not valid with this patient's other Lipid values. Triglyceride 174 09/17/2013 01:20 AM    CHOL/HDL Ratio 11.9 (H) 09/17/2013 01:20 AM       On this date 06/23/2022 I have spent >45 minutes reviewing previous notes, test results and face to face with the patient and her family discussing working diagnoses and treatment plan. Medical decision making complexity: moderate-high.     Kenan Rodrigues MD   Family & Geriatric Medicine

## 2022-06-24 LAB
ALBUMIN SERPL-MCNC: 3.5 G/DL (ref 3.6–4.6)
ALBUMIN/GLOB SERPL: 0.9 {RATIO} (ref 1.2–2.2)
ALP SERPL-CCNC: 82 IU/L (ref 44–121)
ALT SERPL-CCNC: 23 IU/L (ref 0–32)
AST SERPL-CCNC: 37 IU/L (ref 0–40)
BASOPHILS # BLD AUTO: 0 X10E3/UL (ref 0–0.2)
BASOPHILS NFR BLD AUTO: 0 %
BILIRUB SERPL-MCNC: 0.6 MG/DL (ref 0–1.2)
BUN SERPL-MCNC: 13 MG/DL (ref 8–27)
BUN/CREAT SERPL: 12 (ref 12–28)
CALCIUM SERPL-MCNC: 10.3 MG/DL (ref 8.7–10.3)
CHLORIDE SERPL-SCNC: 99 MMOL/L (ref 96–106)
CO2 SERPL-SCNC: 23 MMOL/L (ref 20–29)
CREAT SERPL-MCNC: 1.11 MG/DL (ref 0.57–1)
EGFR: 48 ML/MIN/1.73
EOSINOPHIL # BLD AUTO: 0.3 X10E3/UL (ref 0–0.4)
EOSINOPHIL NFR BLD AUTO: 5 %
ERYTHROCYTE [DISTWIDTH] IN BLOOD BY AUTOMATED COUNT: 13.1 % (ref 11.7–15.4)
GLOBULIN SER CALC-MCNC: 3.9 G/DL (ref 1.5–4.5)
GLUCOSE SERPL-MCNC: 126 MG/DL (ref 65–99)
HCT VFR BLD AUTO: 32.8 % (ref 34–46.6)
HGB BLD-MCNC: 11.6 G/DL (ref 11.1–15.9)
IMM GRANULOCYTES # BLD AUTO: 0 X10E3/UL (ref 0–0.1)
IMM GRANULOCYTES NFR BLD AUTO: 0 %
LYMPHOCYTES # BLD AUTO: 1 X10E3/UL (ref 0.7–3.1)
LYMPHOCYTES NFR BLD AUTO: 15 %
MAGNESIUM SERPL-MCNC: 1.9 MG/DL (ref 1.6–2.3)
MCH RBC QN AUTO: 29.5 PG (ref 26.6–33)
MCHC RBC AUTO-ENTMCNC: 35.4 G/DL (ref 31.5–35.7)
MCV RBC AUTO: 84 FL (ref 79–97)
MONOCYTES # BLD AUTO: 0.3 X10E3/UL (ref 0.1–0.9)
MONOCYTES NFR BLD AUTO: 5 %
NEUTROPHILS # BLD AUTO: 5 X10E3/UL (ref 1.4–7)
NEUTROPHILS NFR BLD AUTO: 75 %
PHOSPHATE SERPL-MCNC: 3.9 MG/DL (ref 3–4.3)
PLATELET # BLD AUTO: 294 X10E3/UL (ref 150–450)
POTASSIUM SERPL-SCNC: 4 MMOL/L (ref 3.5–5.2)
PROT SERPL-MCNC: 7.4 G/DL (ref 6–8.5)
RBC # BLD AUTO: 3.93 X10E6/UL (ref 3.77–5.28)
SODIUM SERPL-SCNC: 138 MMOL/L (ref 134–144)
VIT B12 SERPL-MCNC: 964 PG/ML (ref 232–1245)
WBC # BLD AUTO: 6.8 X10E3/UL (ref 3.4–10.8)

## 2022-06-24 RX ORDER — NYSTATIN 100000 [USP'U]/G
POWDER TOPICAL 3 TIMES DAILY
Qty: 1 EACH | Refills: 1 | Status: SHIPPED | OUTPATIENT
Start: 2022-06-24 | End: 2022-10-24 | Stop reason: SDUPTHER

## 2022-06-28 ENCOUNTER — HOSPITAL ENCOUNTER (EMERGENCY)
Age: 87
Discharge: HOME | End: 2022-06-28
Attending: EMERGENCY MEDICINE
Payer: MEDICARE

## 2022-06-28 VITALS
WEIGHT: 293 LBS | OXYGEN SATURATION: 100 % | RESPIRATION RATE: 20 BRPM | DIASTOLIC BLOOD PRESSURE: 69 MMHG | HEART RATE: 89 BPM | HEIGHT: 66 IN | SYSTOLIC BLOOD PRESSURE: 120 MMHG | TEMPERATURE: 98.1 F | BODY MASS INDEX: 47.09 KG/M2

## 2022-06-28 DIAGNOSIS — R41.0 CONFUSION: Primary | ICD-10-CM

## 2022-06-28 LAB
ANION GAP SERPL CALC-SCNC: 6 MMOL/L (ref 3–18)
BASOPHILS # BLD: 0 K/UL (ref 0–0.1)
BASOPHILS NFR BLD: 0 % (ref 0–2)
BUN SERPL-MCNC: 16 MG/DL (ref 7–18)
BUN/CREAT SERPL: 14 (ref 12–20)
CALCIUM SERPL-MCNC: 10.1 MG/DL (ref 8.5–10.1)
CHLORIDE SERPL-SCNC: 104 MMOL/L (ref 100–111)
CO2 SERPL-SCNC: 28 MMOL/L (ref 21–32)
CREAT SERPL-MCNC: 1.18 MG/DL (ref 0.6–1.3)
DIFFERENTIAL METHOD BLD: ABNORMAL
EOSINOPHIL # BLD: 0.3 K/UL (ref 0–0.4)
EOSINOPHIL NFR BLD: 4 % (ref 0–5)
ERYTHROCYTE [DISTWIDTH] IN BLOOD BY AUTOMATED COUNT: 14.6 % (ref 11.6–14.5)
GLUCOSE SERPL-MCNC: 140 MG/DL (ref 74–99)
HCT VFR BLD AUTO: 36.6 % (ref 35–45)
HGB BLD-MCNC: 11.9 G/DL (ref 12–16)
IMM GRANULOCYTES # BLD AUTO: 0 K/UL (ref 0–0.04)
IMM GRANULOCYTES NFR BLD AUTO: 0 % (ref 0–0.5)
LYMPHOCYTES # BLD: 1.3 K/UL (ref 0.9–3.6)
LYMPHOCYTES NFR BLD: 16 % (ref 21–52)
MCH RBC QN AUTO: 28.3 PG (ref 24–34)
MCHC RBC AUTO-ENTMCNC: 32.5 G/DL (ref 31–37)
MCV RBC AUTO: 86.9 FL (ref 78–100)
MONOCYTES # BLD: 0.4 K/UL (ref 0.05–1.2)
MONOCYTES NFR BLD: 5 % (ref 3–10)
NEUTS SEG # BLD: 6.1 K/UL (ref 1.8–8)
NEUTS SEG NFR BLD: 74 % (ref 40–73)
NRBC # BLD: 0 K/UL (ref 0–0.01)
NRBC BLD-RTO: 0 PER 100 WBC
PLATELET # BLD AUTO: 325 K/UL (ref 135–420)
PMV BLD AUTO: 10.3 FL (ref 9.2–11.8)
POTASSIUM SERPL-SCNC: 4.2 MMOL/L (ref 3.5–5.5)
RBC # BLD AUTO: 4.21 M/UL (ref 4.2–5.3)
SODIUM SERPL-SCNC: 138 MMOL/L (ref 136–145)
WBC # BLD AUTO: 8.1 K/UL (ref 4.6–13.2)

## 2022-06-28 PROCEDURE — 80048 BASIC METABOLIC PNL TOTAL CA: CPT

## 2022-06-28 PROCEDURE — 85025 COMPLETE CBC W/AUTO DIFF WBC: CPT

## 2022-06-28 PROCEDURE — 93005 ELECTROCARDIOGRAM TRACING: CPT

## 2022-06-28 PROCEDURE — 99284 EMERGENCY DEPT VISIT MOD MDM: CPT

## 2022-06-29 NOTE — ED PROVIDER NOTES
EMERGENCY DEPARTMENT HISTORY AND PHYSICAL EXAM    8:12 PM patient seen at this time in room 2      Date: 6/28/2022  Patient Name: Barbara Patterson    History of Presenting Illness     Chief Complaint   Patient presents with    Altered mental status         History Provided By: patient/ED nurse    Additional History (Context): Barbara Patterson is a 80 y.o. female presents with patient with a history of dementia ANO x1 only to self at baseline had about a 1 hour period of increased confusion which was off from her baseline. Did not lose consciousness did not describe any pain or have any difficulty breathing no vomiting. She returned to her baseline but family had already contacted EMS. Here in the emergency department the patient herself is alert and conversant without any complaint friendly and conversational no acute distress. Krissy Masterson PCP: Andie Pratt MD    Chief Complaint:   Duration:    Timing:    Location:   Quality:   Severity:   Modifying Factors:   Associated Symptoms:       Current Outpatient Medications   Medication Sig Dispense Refill    nystatin (MYCOSTATIN) powder Apply  to affected area three (3) times daily. 1 Each 1    cetirizine (Allergy Relief, cetirizine,) 10 mg tablet Take 10 mg by mouth daily.  cholecalciferol (Vitamin D3) (2,000 UNITS /50 MCG) cap capsule Take 2,000 Units by mouth daily.  pravastatin (PRAVACHOL) 20 mg tablet Take 20 mg by mouth nightly.  sertraline (ZOLOFT) 25 mg tablet Take 1 Tablet by mouth daily. 90 Tablet 0    QUEtiapine (SEROquel) 25 mg tablet Half to one tablet QHS PRN agitation. 30 Tablet 1    hydrocortisone (CORTAID) 1 % topical cream Apply  to affected area two (2) times a day. use thin layer 30 g 0    clotrimazole (LOTRIMIN) 1 % topical cream Apply  to affected area two (2) times a day.  15 g 0    dilTIAZem CD (CARTIA XT) 120 mg ER capsule TAKE ONE CAPSULE BY MOUTH DAILY 90 Cap 1    furosemide (LASIX) 40 mg tablet TAKE ONE TABLET BY MOUTH DAILY 9 Tab 0    ACCU-CHEK FASTCLIX LANCET DRUM misc USE ONE LANCET TO TEST TWICE A  Each 1    ACCU-CHEK SMARTVIEW TEST STRIP strip USE 1 STRIP TWO TIMES A DAY TO CHECK BLOOD SUGAR 100 Strip 8    glucose blood VI test strips (ACCU-CHEK SMARTVIEW TEST STRIP) strip Use to check blood glucose twice daily DX E11.9--For use with Accu-check jeff meter 100 Strip 11    Blood-Glucose Meter (ACCU-CHEK JEFF) misc Use to check glucose twice daily. Dx:E11.9 1 Each 0    glucose blood VI test strips (FREESTYLE TEST) strip Test blood sugar 2 times daily dx:E11.9 100 Strip 11       Past History     Past Medical History:  Past Medical History:   Diagnosis Date    Congestive heart failure (HCC)     Diabetes ([address]     High cholesterol     Hypertension        Past Surgical History:  Past Surgical History:   Procedure Laterality Date    HX HYSTERECTOMY  1979       Family History:  Family History   Problem Relation Age of Onset    Heart Disease Mother     Diabetes Sister     Hypertension Sister     Other Sister         brain tumor       Social History:  Social History     Tobacco Use    Smoking status: Never Smoker    Smokeless tobacco: Never Used   Vaping Use    Vaping Use: Never used   Substance Use Topics    Alcohol use: No    Drug use: Never       Allergies:  No Known Allergies      Review of Systems     Review of Systems   Constitutional: Negative for diaphoresis and fever. HENT: Negative for congestion and sore throat. Eyes: Negative for pain and itching. Respiratory: Negative for cough and shortness of breath. Cardiovascular: Negative for chest pain and palpitations. Gastrointestinal: Negative for abdominal pain and diarrhea. Endocrine: Negative for polydipsia and polyuria. Genitourinary: Negative for dysuria and hematuria. Musculoskeletal: Negative for arthralgias and myalgias. Skin: Negative for rash and wound. Neurological: Negative for seizures and syncope.    Hematological: Does not bruise/bleed easily. Psychiatric/Behavioral: Negative for agitation and hallucinations. Physical Exam       Patient Vitals for the past 12 hrs:   Temp Pulse Resp BP SpO2   06/28/22 1955 98.1 °F (36.7 °C) 90 15 114/70 100 %       IPVITALS  Patient Vitals for the past 24 hrs:   BP Temp Pulse Resp SpO2 Height Weight   06/28/22 1955 114/70 98.1 °F (36.7 °C) 90 15 100 % 5' 6\" (1.676 m) 150.1 kg (331 lb)       Physical Exam  Vitals and nursing note reviewed. Constitutional:       Appearance: She is well-developed. HENT:      Head: Normocephalic and atraumatic. Eyes:      General: No scleral icterus. Conjunctiva/sclera: Conjunctivae normal.   Neck:      Vascular: No JVD. Cardiovascular:      Rate and Rhythm: Normal rate and regular rhythm. Heart sounds: Normal heart sounds. Comments: 4 intact extremity pulses  Pulmonary:      Effort: Pulmonary effort is normal.      Breath sounds: Normal breath sounds. Abdominal:      Palpations: Abdomen is soft. There is no mass. Tenderness: There is no abdominal tenderness. Musculoskeletal:         General: Normal range of motion. Cervical back: Normal range of motion and neck supple. Lymphadenopathy:      Cervical: No cervical adenopathy. Skin:     General: Skin is warm and dry. Neurological:      General: No focal deficit present. Mental Status: She is alert. Cranial Nerves: No cranial nerve deficit. Comments: Speech normal word selection and appropriate sentences no abnormality appreciated.   Oriented to self           Diagnostic Study Results   Labs -  Recent Results (from the past 24 hour(s))   CBC WITH AUTOMATED DIFF    Collection Time: 06/28/22  9:00 PM   Result Value Ref Range    WBC 8.1 4.6 - 13.2 K/uL    RBC 4.21 4.20 - 5.30 M/uL    HGB 11.9 (L) 12.0 - 16.0 g/dL    HCT 36.6 35.0 - 45.0 %    MCV 86.9 78.0 - 100.0 FL    MCH 28.3 24.0 - 34.0 PG    MCHC 32.5 31.0 - 37.0 g/dL    RDW 14.6 (H) 11.6 - 14.5 %    PLATELET 325 135 - 420 K/uL    MPV 10.3 9.2 - 11.8 FL    NRBC 0.0 0  WBC    ABSOLUTE NRBC 0.00 0.00 - 0.01 K/uL    NEUTROPHILS 74 (H) 40 - 73 %    LYMPHOCYTES 16 (L) 21 - 52 %    MONOCYTES 5 3 - 10 %    EOSINOPHILS 4 0 - 5 %    BASOPHILS 0 0 - 2 %    IMMATURE GRANULOCYTES 0 0.0 - 0.5 %    ABS. NEUTROPHILS 6.1 1.8 - 8.0 K/UL    ABS. LYMPHOCYTES 1.3 0.9 - 3.6 K/UL    ABS. MONOCYTES 0.4 0.05 - 1.2 K/UL    ABS. EOSINOPHILS 0.3 0.0 - 0.4 K/UL    ABS. BASOPHILS 0.0 0.0 - 0.1 K/UL    ABS. IMM. GRANS. 0.0 0.00 - 0.04 K/UL    DF AUTOMATED     METABOLIC PANEL, BASIC    Collection Time: 06/28/22  9:00 PM   Result Value Ref Range    Sodium 138 136 - 145 mmol/L    Potassium 4.2 3.5 - 5.5 mmol/L    Chloride 104 100 - 111 mmol/L    CO2 28 21 - 32 mmol/L    Anion gap 6 3.0 - 18 mmol/L    Glucose 140 (H) 74 - 99 mg/dL    BUN 16 7.0 - 18 MG/DL    Creatinine 1.18 0.6 - 1.3 MG/DL    BUN/Creatinine ratio 14 12 - 20      GFR est AA 52 (L) >60 ml/min/1.73m2    GFR est non-AA 43 (L) >60 ml/min/1.73m2    Calcium 10.1 8.5 - 10.1 MG/DL       Radiologic Studies -   No orders to display     No results found. Medications ordered:   Medications - No data to display      Medical Decision Making   Initial Medical Decision Making and DDx:  No acute discomfort or abnormality to further focus the work-up she seems to have had a transient period of increased confusion at this point I do not suspect encephalopathy. There is no stroke syndrome. We will get basic labs and EKG await family and discuss further, consider discharge    ED Course: Progress Notes, Reevaluation, and Consults:  ED Course as of 06/28/22 2156 Tue Jun 28, 2022 2020 Twelve-lead EKG sinus rhythm with arrhythmia at 88 no acute process [CB]      ED Course User Index  [CB] Claribel Palmer MD     9:56 PM no alarming events during her stay in the ER patient is at her normal baseline.   Nonactionable work-up, discussed with family including her grandson who is a physician, they are agreeable with her being discharged, return to the emergency department for any concerning symptoms    I am the first provider for this patient. I reviewed the vital signs, available nursing notes, past medical history, past surgical history, family history and social history. Patient Vitals for the past 12 hrs:   Temp Pulse Resp BP SpO2   06/28/22 1955 98.1 °F (36.7 °C) 90 15 114/70 100 %       Vital Signs-Reviewed the patient's vital signs. Pulse Oximetry Analysis, Cardiac Monitor, 12 lead ekg:      Interpreted by the EP. Records Reviewed: Nursing notes reviewed (Time of Review: 8:12 PM)    Procedures:   Critical Care Time:   Aspirin: (was aspirin given for stroke?)    Diagnosis     Clinical Impression:   1. Confusion        Disposition: Discharged      Follow-up Information     Follow up With Specialties Details Why Contact Info    Cheikh Cabezas MD Family Medicine In 3 days  [facility]  [phone]             Patient's Medications   Start Taking    No medications on file   Continue Taking    ACCU-CHEK FASTCLIX LANCET DRUM MISC    USE ONE LANCET TO TEST TWICE A DAY    ACCU-CHEK SMARTVIEW TEST STRIP STRIP    USE 1 STRIP TWO TIMES A DAY TO CHECK BLOOD SUGAR    BLOOD-GLUCOSE METER (ACCU-CHEK LISETTE) MISC    Use to check glucose twice daily. Dx:E11.9    CETIRIZINE (ALLERGY RELIEF, CETIRIZINE,) 10 MG TABLET    Take 10 mg by mouth daily. CHOLECALCIFEROL (VITAMIN D3) (2,000 UNITS /50 MCG) CAP CAPSULE    Take 2,000 Units by mouth daily. CLOTRIMAZOLE (LOTRIMIN) 1 % TOPICAL CREAM    Apply  to affected area two (2) times a day.     DILTIAZEM CD (CARTIA XT) 120 MG ER CAPSULE    TAKE ONE CAPSULE BY MOUTH DAILY    FUROSEMIDE (LASIX) 40 MG TABLET    TAKE ONE TABLET BY MOUTH DAILY    GLUCOSE BLOOD VI TEST STRIPS (ACCU-CHEK SMARTVIEW TEST STRIP) STRIP    Use to check blood glucose twice daily DX E11.9--For use with Accu-check lisette meter    GLUCOSE BLOOD VI TEST STRIPS (FREESTYLE TEST) STRIP    Test blood sugar 2 times daily dx:E11.9    HYDROCORTISONE (CORTAID) 1 % TOPICAL CREAM    Apply  to affected area two (2) times a day. use thin layer    NYSTATIN (MYCOSTATIN) POWDER    Apply  to affected area three (3) times daily. PRAVASTATIN (PRAVACHOL) 20 MG TABLET    Take 20 mg by mouth nightly. QUETIAPINE (SEROQUEL) 25 MG TABLET    Half to one tablet QHS PRN agitation. SERTRALINE (ZOLOFT) 25 MG TABLET    Take 1 Tablet by mouth daily.    These Medications have changed    No medications on file   Stop Taking    No medications on file     _______________________________    Notes:    Frank Vaz MD using Dragon dictation      _______________________________

## 2022-06-29 NOTE — ED NOTES
Unable to obtain line and labs after multiple attempts. ED tech will use the ultrasound machine to obtain line and labs.      EKG completed and signed by provider

## 2022-06-29 NOTE — ED TRIAGE NOTES
Patient presents to the ED via EMS from home for evaluation of AMS. Per medic, \"She has a history of dementia. According to the family, about an hour before they called us, she \"acted not herself and altered. \" When we arrived they said she was back to baseline but want her checked out. \"    On arrival patient is A&O only to self. Patient has no complaints. Pt daughter requesting callback regarding pt.  Please call.

## 2022-06-29 NOTE — ED NOTES
10:55 PM  06/28/22     Discharge instructions given to family (name) with verbalization of understanding. Patient accompanied by son, . Patient discharged with the following prescriptions none. Patient discharged to home (destination).       Radha Nuñez RN

## 2022-06-30 LAB
ATRIAL RATE: 88 BPM
CALCULATED P AXIS, ECG09: 47 DEGREES
CALCULATED R AXIS, ECG10: -48 DEGREES
CALCULATED T AXIS, ECG11: 63 DEGREES
DIAGNOSIS, 93000: NORMAL
P-R INTERVAL, ECG05: 194 MS
Q-T INTERVAL, ECG07: 344 MS
QRS DURATION, ECG06: 92 MS
QTC CALCULATION (BEZET), ECG08: 416 MS
VENTRICULAR RATE, ECG03: 88 BPM

## 2022-07-01 ENCOUNTER — HOSPITAL ENCOUNTER (OUTPATIENT)
Age: 87
Discharge: HOME | End: 2022-07-01
Attending: FAMILY MEDICINE

## 2022-07-01 DIAGNOSIS — R41.82 ALTERED MENTAL STATUS: ICD-10-CM

## 2022-07-06 ENCOUNTER — VIRTUAL VISIT (OUTPATIENT)
Dept: INTERNAL MEDICINE CLINIC | Age: 87
End: 2022-07-06

## 2022-07-06 DIAGNOSIS — Z00.00 MEDICARE ANNUAL WELLNESS VISIT, SUBSEQUENT: Primary | ICD-10-CM

## 2022-07-14 DIAGNOSIS — R21 RASH: ICD-10-CM

## 2022-07-14 DIAGNOSIS — E78.00 HIGH CHOLESTEROL: Primary | ICD-10-CM

## 2022-07-14 RX ORDER — CHLORPHENIRAMINE MALEATE 4 MG
TABLET ORAL 2 TIMES DAILY
Qty: 30 G | Refills: 0 | Status: SHIPPED | OUTPATIENT
Start: 2022-07-14 | End: 2022-08-01 | Stop reason: SDUPTHER

## 2022-07-14 RX ORDER — HYDROCORTISONE 1 %
CREAM (GRAM) TOPICAL 2 TIMES DAILY
Qty: 60 G | Refills: 0 | Status: SHIPPED | OUTPATIENT
Start: 2022-07-14 | End: 2022-08-01 | Stop reason: SDUPTHER

## 2022-07-14 RX ORDER — PRAVASTATIN SODIUM 20 MG/1
20 TABLET ORAL
Qty: 90 TABLET | Refills: 0 | Status: SHIPPED | OUTPATIENT
Start: 2022-07-14 | End: 2022-08-01 | Stop reason: SDUPTHER

## 2022-08-01 DIAGNOSIS — E78.00 HIGH CHOLESTEROL: ICD-10-CM

## 2022-08-01 DIAGNOSIS — R21 RASH: ICD-10-CM

## 2022-08-01 RX ORDER — CHLORPHENIRAMINE MALEATE 4 MG
TABLET ORAL 2 TIMES DAILY
Qty: 30 G | Refills: 1 | Status: SHIPPED | OUTPATIENT
Start: 2022-08-01

## 2022-08-01 RX ORDER — PRAVASTATIN SODIUM 20 MG/1
20 TABLET ORAL
Qty: 90 TABLET | Refills: 1 | Status: SHIPPED | OUTPATIENT
Start: 2022-08-01 | End: 2022-10-15 | Stop reason: SDUPTHER

## 2022-08-01 RX ORDER — HYDROCORTISONE 1 %
CREAM (GRAM) TOPICAL 2 TIMES DAILY
Qty: 60 G | Refills: 1 | Status: SHIPPED | OUTPATIENT
Start: 2022-08-01

## 2022-08-04 ENCOUNTER — VIRTUAL VISIT (OUTPATIENT)
Dept: INTERNAL MEDICINE CLINIC | Age: 87
End: 2022-08-04
Payer: MEDICARE

## 2022-08-04 ENCOUNTER — HOME HEALTH ADMISSION (OUTPATIENT)
Dept: HOME HEALTH SERVICES | Facility: HOME HEALTH | Age: 87
End: 2022-08-04
Payer: MEDICARE

## 2022-08-04 DIAGNOSIS — R21 RASH: ICD-10-CM

## 2022-08-04 DIAGNOSIS — F03.91 DEMENTIA WITH BEHAVIORAL DISTURBANCE, UNSPECIFIED DEMENTIA TYPE: Primary | ICD-10-CM

## 2022-08-04 DIAGNOSIS — Z78.9 IMPAIRED MOBILITY AND ADLS: ICD-10-CM

## 2022-08-04 DIAGNOSIS — Z71.89 ADVANCED DIRECTIVES, COUNSELING/DISCUSSION: ICD-10-CM

## 2022-08-04 DIAGNOSIS — N18.31 STAGE 3A CHRONIC KIDNEY DISEASE (HCC): ICD-10-CM

## 2022-08-04 DIAGNOSIS — Z74.09 IMPAIRED MOBILITY AND ADLS: ICD-10-CM

## 2022-08-04 DIAGNOSIS — I48.0 PAROXYSMAL A-FIB (HCC): ICD-10-CM

## 2022-08-04 DIAGNOSIS — E66.01 MORBID OBESITY WITH BMI OF 50.0-59.9, ADULT (HCC): ICD-10-CM

## 2022-08-04 PROBLEM — N18.30 CHRONIC RENAL DISEASE, STAGE III (HCC): Status: ACTIVE | Noted: 2022-08-04

## 2022-08-04 PROCEDURE — 99442 PR PHYS/QHP TELEPHONE EVALUATION 11-20 MIN: CPT | Performed by: FAMILY MEDICINE

## 2022-08-04 RX ORDER — FUROSEMIDE 20 MG/1
20 TABLET ORAL DAILY
Qty: 90 TABLET | Refills: 1 | Status: SHIPPED | OUTPATIENT
Start: 2022-08-04 | End: 2022-10-12 | Stop reason: SDUPTHER

## 2022-08-04 RX ORDER — QUETIAPINE FUMARATE 25 MG/1
TABLET, FILM COATED ORAL
Qty: 90 TABLET | Refills: 1 | Status: SHIPPED | OUTPATIENT
Start: 2022-08-04

## 2022-08-04 RX ORDER — FLASH GLUCOSE SENSOR
KIT MISCELLANEOUS
COMMUNITY
Start: 2022-07-09 | End: 2022-11-02

## 2022-08-04 RX ORDER — GUAIFENESIN 100 MG/5ML
162 LIQUID (ML) ORAL DAILY
COMMUNITY

## 2022-08-04 RX ORDER — DILTIAZEM HYDROCHLORIDE 120 MG/1
CAPSULE, COATED, EXTENDED RELEASE ORAL
Qty: 90 CAPSULE | Refills: 1 | Status: SHIPPED | OUTPATIENT
Start: 2022-08-04

## 2022-08-04 NOTE — PROGRESS NOTES
Essence Adkins (: 1933) is a 80 y.o. female seen for evaluation of the following chief concern(s):  Follow-up; Chronic condition management    The services today are being conducted using telemedicine which Essence Adkins has consented to at the time of scheduling. ASSESSMENT/PLAN:  1. Dementia with behavioral disturbance, unspecified dementia type ([facility] [address]  Mood/behaviors have improved on low dose Sertraline and Quetiapine; we will continue current doses. Refer to [facility]; for PT/OT to eval and treat for strength/balance/endurance training, passive ROM, fall risk reduction, needs for DME to include need for bariatric tori lift, home health/personal aid. SLT to evaluate for dysphagia, reduce risk for aspiration.  -     REFERRAL TO HOME HEALTH  -     AMB SUPPLY ORDER    2. Impaired mobility and ADLs  As above. -     REFERRAL TO HOME HEALTH  -     AMB SUPPLY ORDER    3. Morbid obesity with BMI of 50.0-59.9, adult ([facility]  As above. -     REFERRAL TO HOME HEALTH  -     AMB SUPPLY ORDER    4. Paroxysmal A-fib [facility])  Patient is taking aspirin 162 mg, family defers for anticoagulation to recommendation of Cardiologist.  Dr. Titus St will be calling to schedule for consultation with Dr. Le Mcdonald or associate in the near future. -     dilTIAZem ER (Cartia XT) 120 mg capsule; TAKE ONE CAPSULE BY MOUTH DAILY    5. Rash  Significantly improved, will continue topical antifungals. 6. CKD-3   Renal indices improved back to baseline compared to time of last admission. 7. Advanced directives, counseling/discussion  -     DO NOT RESUSCITATE      Essence Adkins and her grandson agree with plan as above and have no additional questions at this time. SUBJECTIVE/OBJECTIVE:  Mrs. Ben Cooper presents to clinic accompanied by her grandson/mPOA, Dr. Titus St, who assist with HPI.     Overall, Mrs. Tisha Ogden condition is stable to improved, though cognition has not returned to baseline compared to ~6 months ago. Pt is less combative on the Sertraline and Quetiapine, family has had to administer an additional dose of Quetiapine rarely some nights for insomnia/agitation. She was not able to get the MRI secondary to agitation- family does not plan to re-visit this at this time. Since last visit, 6/28/22 ER evaluation for altered mental status, unclear etiology but symptoms self resolved. Home health orders from last visit were not processed as the agency is unable to accept \"R codes\" for the services requested. Her mobility has deteriorated, she is now requiring 2 person assist for transfers. Family feels that pt would benefit from PT/OT/SLT and a home health aid- prefer [facility] in [facility], as well as additional DME- specifically a Dejah lift given mobility impairment and elevated BMI for improved safety to pt and caregivers. She already has/uses a manual and motorized wheelchair. Approximate weight 340 Lbs- last weight at [facility], possibly some weight loss since that time. Interval hx;  Recent hospitalization at [facility] for AMS presumed secondary to UTI, hyponatremia (Na 128) and ELIZABETH, PAF, concern for TIA vs CVA- pt was not able to tolerate MRI at the time 2/2 agitation. Lasix was decreased form 40mg to 20mh, and Losartan was discontinued. No discharge summary available for review at this time. MRI brain scheduled for Friday. No ECHO is scheduled. Agitation between 5-8PM.  Discharged her on Olanzapine 10mg QHS; this seemed to help at first, added melatonin but persistent insomnia. Family hopeful HH for PT/OT/home health aid; order replaced. DM w/ nephropathy:  5/28/22 HA1C 8.0%  D/c on Glipizide, stopped after 3-4 days for BG to 80's. Had Podiatry visit last week, no neuropathy. Concern for Left eye- redness, swollen eyelid, improved some s/p Erythromycin. RLE rash, per family presumes related to SCDs. Rash under breasts.     Unable to obtain accurate ROS secondary to cognitive impairment. Past Medical History:   Diagnosis Date    Congestive heart failure (HCC)     Diabetes ([facility] [address]     High cholesterol     Hypertension      Past Surgical History:   Procedure Laterality Date    HX HYSTERECTOMY  1979     Family History   Problem Relation Age of Onset    Heart Disease Mother     Diabetes Sister     Hypertension Sister     Other Sister         brain tumor       Social History     Socioeconomic History    Marital status:      Spouse name: Not on file    Number of children: Not on file    Years of education: Not on file    Highest education level: Not on file   Occupational History    Not on file   Tobacco Use    Smoking status: Never    Smokeless tobacco: Never   Vaping Use    Vaping Use: Never used   Substance and Sexual Activity    Alcohol use: No    Drug use: Never    Sexual activity: Not on file   Other Topics Concern    Not on file   Social History Narrative    Not on file     Social Determinants of Health     Financial Resource Strain: Not on file   Food Insecurity: Not on file   Transportation Needs: Not on file   Physical Activity: Not on file   Stress: Not on file   Social Connections: Not on file   Intimate Partner Violence: Not on file   Housing Stability: Not on file     Social History     Tobacco Use   Smoking Status Never   Smokeless Tobacco Never       Current Outpatient Medications   Medication Sig Dispense Refill    dilTIAZem ER (Cartia XT) 120 mg capsule TAKE ONE CAPSULE BY MOUTH DAILY 90 Capsule 1    pravastatin (PRAVACHOL) 20 mg tablet Take 1 Tablet by mouth nightly. 90 Tablet 1    clotrimazole (LOTRIMIN) 1 % topical cream Apply  to affected area two (2) times a day. 30 g 1    hydrocortisone (CORTAID) 1 % topical cream Apply  to affected area two (2) times a day. use thin layer 60 g 1    nystatin (MYCOSTATIN) powder Apply  to affected area three (3) times daily. 1 Each 1    cetirizine (ZYRTEC) 10 mg tablet Take 10 mg by mouth daily. cholecalciferol (VITAMIN D3) (2,000 UNITS /50 MCG) cap capsule Take 2,000 Units by mouth daily. sertraline (ZOLOFT) 25 mg tablet Take 1 Tablet by mouth daily. 90 Tablet 0    QUEtiapine (SEROquel) 25 mg tablet Half to one tablet QHS PRN agitation. 30 Tablet 1    furosemide (LASIX) 40 mg tablet TAKE ONE TABLET BY MOUTH DAILY 9 Tab 0    ACCU-CHEK FASTCLIX LANCET DRUM misc USE ONE LANCET TO TEST TWICE A  Each 1    ACCU-CHEK SMARTVIEW TEST STRIP strip USE 1 STRIP TWO TIMES A DAY TO CHECK BLOOD SUGAR 100 Strip 8    aspirin 81 mg chewable tablet Take 162 mg by mouth in the morning. FreeStyle Yao 14 Day Sensor kit USE TO TEST 3 TIMES A DAY AND AS NEEDED       No Known Allergies    There were no vitals taken for this visit. Physical exam:  GEN: Awake, alert, conversant with normal voice/speech. PULM: Unlabored respiration, no cough. Lab Results   Component Value Date/Time    WBC 8.1 06/28/2022 09:00 PM    HGB 11.9 (L) 06/28/2022 09:00 PM    HCT 36.6 06/28/2022 09:00 PM    PLATELET 378 44/66/4464 09:00 PM    MCV 86.9 06/28/2022 09:00 PM     Lab Results   Component Value Date/Time    Sodium 138 06/28/2022 09:00 PM    Potassium 4.2 06/28/2022 09:00 PM    Chloride 104 06/28/2022 09:00 PM    CO2 28 06/28/2022 09:00 PM    Anion gap 6 06/28/2022 09:00 PM    Glucose 140 (H) 06/28/2022 09:00 PM    BUN 16 06/28/2022 09:00 PM    Creatinine 1.18 06/28/2022 09:00 PM    BUN/Creatinine ratio 14 06/28/2022 09:00 PM    GFR est AA 52 (L) 06/28/2022 09:00 PM    GFR est non-AA 43 (L) 06/28/2022 09:00 PM    Calcium 10.1 06/28/2022 09:00 PM    Bilirubin, total 0.6 06/23/2022 03:21 PM    Alk.  phosphatase 82 06/23/2022 03:21 PM    Protein, total 7.4 06/23/2022 03:21 PM    Albumin 3.5 (L) 06/23/2022 03:21 PM    Globulin 4.6 (H) 11/07/2018 02:39 PM    A-G Ratio 0.9 (L) 06/23/2022 03:21 PM    ALT (SGPT) 23 06/23/2022 03:21 PM    AST (SGOT) 37 06/23/2022 03:21 PM     Lab Results   Component Value Date/Time    Cholesterol, total 119 09/17/2013 01:20 AM    HDL Cholesterol <10 (L) 09/17/2013 01:20 AM    LDL, calculated NOT CALCULATED DUE TO LOW HDLC LEVEL 09/17/2013 01:20 AM    VLDL, calculated  09/17/2013 01:20 AM     Calculation not valid with this patient's other Lipid values. Triglyceride 174 09/17/2013 01:20 AM    CHOL/HDL Ratio 11.9 (H) 09/17/2013 01:20 AM       On this date 08/04/2022 I have spent >14 minutes for face to face telephone encounter with the patient and her family discussing working diagnoses and treatment plan. Additional time was spent reviewing previous notes, test results. Medical decision making complexity: moderate-high.     Lesly Shepherd MD   Family & Geriatric Medicine

## 2022-08-04 NOTE — ACP (ADVANCE CARE PLANNING)
Mrs. Liana Allison is an 79 yo lady w/ PMH significant for dementia syndrome NOS. At this time, she lacks decisional capacity. Her elected mPOA is her grandson, Dr. Jennifer Mathew. On her behalf, he clarifies code status to be DNR/DNI; orders have been adjusted in the EHR.     Fidelina Gill MD

## 2022-08-04 NOTE — PROGRESS NOTES
Patient is on [address] now instead of vitamin D. Anderson Duran presents today for   Chief Complaint   Patient presents with    Follow-up     2-4 weeks       Depression Screening:  3 most recent PHQ Screens 8/4/2022   Little interest or pleasure in doing things Not at all   Feeling down, depressed, irritable, or hopeless Not at all   Total Score PHQ 2 0       Learning Assessment:  No flowsheet data found. Fall Risk  Fall Risk Assessment, last 12 mths 8/4/2022   Able to walk? Yes   Fall in past 12 months? 0   Do you feel unsteady? 0   Are you worried about falling 0   Is TUG test greater than 12 seconds? -   Is the gait abnormal? -   Number of falls in past 12 months -   Fall with injury? -       ADL  ADL Assessment 6/23/2022   Feeding yourself No Help Needed   Getting from bed to chair Help Needed   Getting dressed Help Needed   Bathing or showering Help Needed   Walk across the room (includes cane/walker) Help Needed   Using the telphone No Help Needed   Taking your medications Help Needed   Preparing meals Help Needed   Managing money (expenses/bills) Help Needed   Moderately strenuous housework (laundry) Help Needed   Shopping for personal items (toiletries/medicines) Help Needed   Shopping for groceries Help Needed   Driving Help Needed   Climbing a flight of stairs Help Needed   Getting to places beyond walking distances Help Needed       Health Maintenance reviewed and discussed and ordered per Provider. Health Maintenance Due   Topic Date Due    Foot Exam Q1  Never done    MICROALBUMIN Q1  Never done    Eye Exam Retinal or Dilated  Never done    DTaP/Tdap/Td series (1 - Tdap) Never done    Shingrix Vaccine Age 50> (1 of 2) Never done    Bone Densitometry (Dexa) Screening  Never done    Medicare Yearly Exam  Never done    Pneumococcal 65+ years (2 - PPSV23 or PCV20) 10/30/2020   . Coordination of Care:  1. \"Have you been to the ER, urgent care clinic since your last visit?   Hospitalized since your last visit? \" No    2. \"Have you seen or consulted any other health care providers outside of the [address] [place] [address] since your last visit? \" No     3. For patients aged 39-70: Has the patient had a colonoscopy? NA - based on age     If the patient is female:    4. For patients aged 41-77: Has the patient had a mammogram within the past 2 years? NA - based on age    11. For patients aged 21-65: Has the patient had a pap smear?  NA - based on age

## 2022-08-09 ENCOUNTER — HOME CARE VISIT (OUTPATIENT)
Dept: HOME HEALTH SERVICES | Facility: HOME HEALTH | Age: 87
End: 2022-08-09

## 2022-08-09 NOTE — CASE COMMUNICATION
Attempted to sched admission visit but patient and cg equested to resched visit to Wednesday 8/10 from 1pm onwards. Katherine and MD were notified.

## 2022-08-10 ENCOUNTER — HOME CARE VISIT (OUTPATIENT)
Dept: SCHEDULING | Facility: HOME HEALTH | Age: 87
End: 2022-08-10
Payer: MEDICARE

## 2022-08-10 ENCOUNTER — HOME CARE VISIT (OUTPATIENT)
Dept: HOME HEALTH SERVICES | Facility: HOME HEALTH | Age: 87
End: 2022-08-10

## 2022-08-10 PROCEDURE — G0151 HHCP-SERV OF PT,EA 15 MIN: HCPCS

## 2022-08-10 PROCEDURE — 400013 HH SOC

## 2022-08-10 NOTE — Clinical Note
Patient is an 81 y/o female with PMHx of ELIZABETH (acute kidney injury) ([facility] [address] 9/16/2013 Congestive heart failure ([facility] [address] Diabetes ([facility] [address] High cholesterol Hypertension Sepsis referred to HHPT for Impaired mobility and ADLs. Patient lives with spouse and grandson in a one level house with access to ramp. Spouse is primary cg and is present during this visit. CG can assist with sponge bathing, dressing, turn in bed, bathroom, meal prep and setup,   Secondary Cgs are as follows:  Daughter Cristi Girard can provide short term assistance when needed for transportation to MD appointment  Grandson assists with medication management transportation to and from MD appointments. Patient has a PLOF of dependent with ADLs. Prior to May 27th, 2022, patient can feed herself, assists with dressing and requires 1 person assist with bed mobility and transfers using sliding board. Patient started needing 2 person assists after d/c from May 30th hospitalization. During this visit, patient presents with decreased bilateral LEs strength, impaired balance and decreased coordination ,  poor motor control, poor functional activity tolerance and decreased safety in functional mobility skills as evidenced by functional tests. FTSTS: unable, Tinetti and gait test: unable. Patient is dependent (2-person assist) in bed mobility and transfers with 100% verbal cues for proper hand placements and techniques. Patient is non-ambulatory. PATIENT RESPONSE TO PROCEDURE PERFORMED: able to follow 1 step command. As per 8/4/22 virtual visit with James Choudhury MD, [facility] lift was ordered. Cg stated he believed it was sent to [facility] medical. PT called [facility] [employer] , per staff order has been received. PT called James Choudhury MD's office regarding this issue and provided [facility] medical fax number.      Skilled PT services are warranted to educate patient on fall prevention,  pressure ulcer prevention and HEP, Bon LEs strengthening, sitting balance and coordination,  motor control, activity tolerance training. Therapeutic treatment will also focus safety on functional mobility skills and address safety in use of [facility] lift for transfer to decrease risk for falls, decrease risk of rehospitalization. HHPT to assist acquiring [facility] lift. Please call #[phone] with any questions. Thank you! Scarlett Graves

## 2022-08-10 NOTE — HOME HEALTH
Skilled services/Home bound verification:    Skilled Reason for admission/summary of clinical condition: Patient is an 81 y/o female with PMHx of ELIZABETH (acute kidney injury) ([facility] [address] 9/16/2013 Congestive heart failure ([facility] [address] Diabetes ([facility] [address] High cholesterol Hypertension Sepsis referred to PT for Impaired mobility and ADLs. This patient is homebound for the following reasons Requires considerable and taxing effort to leave the home , Requires the assistance of 1 or more persons to leave the home , only leaves the home for medical reasons or [employer] services and are infrequent and of short duration for other reasons , decreased mental status requiring 24-hour supervision  and bed bound. Caregiver: Spouse is primary cg and is present during this visit. CG can assist with sponge bathing, dressing, turn in bed, bathroom, meal prep and setup,   Secondary Cgs are as follows:  Daughter Wilburn Severin can provide short term assistance when needed for transportation to MD appointment  Grandson assists with medication management transportation to and from MD appointments. Medications reconciled and all medications are available in the home this visit. The following education was provided regarding medications, medication interactions, and look alike medications (specify): no significant adverse reactions noted. Medications are effective at this time. High risk medication teaching regarding anticoagulants, hyperglycemic agents or opioid narcotics performed (specify) CHEMA Mccormack MD notified of any discrepancies/medication interactions NA. Home health supplies by type and quantity ordered/delivered this visit include NA    Pt/Caregiver instructed on plan of care and are agreeable to plan of care at this time.       Physician Salvatore Mccormack MD notified of patient admission to home health and plan of care including anticipated frequency of 1w5 and treatments/interventions/modalities of education on pain management techniques, graded therapeutic exercises, balance training, bed mobility training, transfers training, HEP ed, education on energy conservation techniques and d/c planning. Discharge planning discussed with patient and caregiver. Discharge planning as follows: d/c to self, family and under supervision of MD.  Pt/Caregiver did verbalize understanding of discharge planning. Next MD appointment 10/5/22  with Dilma Valdez MD. Patient/caregiver encouraged/instructed to keep appointment as lack of follow through with physician appointment could result in discontinuation of home care services for non-compliance. -  A written copy of the [facility] of Rights was given and verbally reviewed on this visit. The patient or representative was given the opportunity to ask pertinent questions regarding the bill of rights. Carlydilcia Sarabia of rights information was received by patient. Reviewed with patient and cgs. Admission handbook left in the home. Emergency Evacuation Information   Reviewed emergency plan with patient/caregiver. -  Subjective:   Pain: Patient denies any jenn during this visit. Cg reported pain has h/o of bilateral knee pains and they are managing by applying voltaren gel and Tylenol as needed. -  Objective  Balance:  Unsupported sitting: Good  Static Standing balance: NA d/t safety reasons  Dynamic standing balance: NA d/t safety reasons  Tinetti balance and gait test: NA d/t safety reasons  ROM: WFL on bilateral LEs. BLE MMT:  R hip flex 2+/5   R hip Abd 2+/5   R hip add 2+/5   R knee flex 2+/5  R knee ext. 2+/5    R ankle DF 2+/5  L hip flex 2+/5  L hip Abd 2+/5  L hip add 2+/5  L knee flex 2+/5  L knee ext.  2+/5  L ankle DF 2+/5    FTSTS:  unable  -  Assessment:    Patient is an 81 y/o female with PMHx of ELIZABETH (acute kidney injury) ([facility] [address] 9/16/2013 Congestive heart failure ([facility] [address] Diabetes ([facility] [address] High cholesterol Hypertension Sepsis referred to HHPT for Impaired mobility and ADLs.  Patient lives with spouse and grandson in a one level house with access to ramp. Spouse is primary cg and is present during this visit. CG can assist with sponge bathing, dressing, turn in bed, bathroom, meal prep and setup,   Secondary Cgs are as follows:  Daughter Lona Siddiqi can provide short term assistance when needed for transportation to MD appointment  Grandson assists with medication management transportation to and from MD appointments. Patient has a PLOF of dependent with ADLs. Prior to May 27th, 2022, patient can feed herself, assists with dressing and requires 1 person assist with bed mobility and transfers using sliding board. Patient started needing 2 person assists after d/c from May 30th hospitalization. During this visit, patient presents with decreased bilateral LEs strength, impaired balance and decreased coordination ,  poor motor control, poor functional activity tolerance and decreased safety in functional mobility skills as evidenced by functional tests. FTSTS: unable, Tinetti and gait test: unable. Patient is dependent (2-person assist) in bed mobility and transfers with 100% verbal cues for proper hand placements and techniques. Patient is non-ambulatory. PATIENT EDUCATION PROVIDED THIS VISIT TO INCLUDE:   FALL PREVENTION TECHNIQUES: monitor medication that may alter mental status,  keeping walking pathways clean and clear of clutter and throw rugs, keeping night lights on for ability to see and easily, good visibility for safe transitioning thresholds and proper use and good compliance of using AD. PRESSURE ULCER PREVENTION TECHNIQUES:  proper positioning strategies including frequency of repositioning, prevention of shear and friction, appropriate skin hygiene and protection from moisture.   Clinician instructed patient/CG on proper disposal of sharps: Containers should be made of hard plastic, be puncture-resistant and leakproof,   such as a laundry detergent or bleach bottle. When the container is ¾ full, it should be sealed with tape and labeled   DO NOT RECYCLE prior to discarding in the regular trash. PATIENT LEVEL OF UNDERSTANDING OF EDUCATION PROVIDED: CG verbalized understanding and able to partially teach back information provided. PATIENT RESPONSE TO PROCEDURE PERFORMED: able to follow 1 step command. As per 8/4/22 virtual visit with Klaudia Canales MD, [facility] lift was ordered. Cg stated he believed it was sent to [facility]. PT called [facility] , per staff order has been received. PT called Klaudia Canales MD's office regarding this issue and provided [facility] fax number. Skilled PT services are warranted to educate patient on fall prevention,  pressure ulcer prevention and HEP, Bon LEs strengthening, sitting balance and coordination,  motor control, activity tolerance training. Therapeutic treatment will also focus safety on functional mobility skills and address safety in use of [facility] lift for transfer to decrease risk for falls, decrease risk of rehospitalization. HHPT to assist acquiring [facility] lift. -  Plan: Patient is seen for initial PT eval today with POC established. Discussed to patient POC and visit frequency of 1w5. Patient verbalized agreement. Patient to receive education on pain management techniques, graded therapeutic exercises, balance and training, bed mobility training, transfers training, HEP ed, education on energy conservation techniques, fall prevention techniques ed, pressure ulcer prevention techniques and d/c planning.

## 2022-08-12 ENCOUNTER — HOME CARE VISIT (OUTPATIENT)
Dept: HOME HEALTH SERVICES | Facility: HOME HEALTH | Age: 87
End: 2022-08-12
Payer: MEDICARE

## 2022-08-15 ENCOUNTER — HOME CARE VISIT (OUTPATIENT)
Dept: SCHEDULING | Facility: HOME HEALTH | Age: 87
End: 2022-08-15
Payer: MEDICARE

## 2022-08-15 VITALS
OXYGEN SATURATION: 100 % | RESPIRATION RATE: 18 BRPM | HEART RATE: 78 BPM | TEMPERATURE: 97.7 F | SYSTOLIC BLOOD PRESSURE: 132 MMHG | DIASTOLIC BLOOD PRESSURE: 60 MMHG

## 2022-08-15 PROCEDURE — G0152 HHCP-SERV OF OT,EA 15 MIN: HCPCS

## 2022-08-16 ENCOUNTER — HOME CARE VISIT (OUTPATIENT)
Dept: SCHEDULING | Facility: HOME HEALTH | Age: 87
End: 2022-08-16
Payer: MEDICARE

## 2022-08-16 VITALS
TEMPERATURE: 98.1 F | DIASTOLIC BLOOD PRESSURE: 93 MMHG | OXYGEN SATURATION: 94 % | HEART RATE: 71 BPM | RESPIRATION RATE: 16 BRPM | SYSTOLIC BLOOD PRESSURE: 148 MMHG

## 2022-08-16 PROCEDURE — G0156 HHCP-SVS OF AIDE,EA 15 MIN: HCPCS

## 2022-08-16 NOTE — HOME HEALTH
Clinical Condition per EPIC:  \"List of Comorbidities: Dementia with behavioral disturbance, unspecified dementia type (HCC)   Impaired mobility and ADLs   Morbid obesity with BMI of 50.0-59.9, adult (HCC)   Paroxysmal A-fib (HCC)   Rash   Stage 3a chronic ki dney disease ([facility] [address]  Advanced directives, counseling/discussion\"  . SUBJECTIVE:  Pt stated \"I want to go home. \" pt sitting EOB upon OT arrival. pt agreed tx. pt daughter and spouse present throughout evaluation. pt demonstrated confusion throughout tx which is pt baseline cognitive level. pt required v/c throughout tx not to attempt to move from EOB for fall prevention. pt does not require OT at this time as is not able to and resists participation due to cognitive deficits, is at baseline level of care, and caregivers demonstrated good safety and technique with level of care that pt requires and which is provided. pt spouse and daughter agree D/C OT at this time as pt does not require it and is not appropriate. CAREGIVER INVOLVEMENT: pt family provides A I/ADL tasks, transfers, shopping, and A with medications. pt requires medical transport for appointments. MEDICATION RECONCILIATION: OT reconcilled medications with pt caregiver with no changes   DME ORDERED/RECOMMENEDED: NA, pt has power WC. pt daughter reports tori lift has been ordered to transfer pt out of bed however it has not arrived yet.     .  OBJECTIVE:  BATHING: D via sponge bath lying supine bed  TOILETING: D via brief in bed  UB DRESSING: D  LB DRESSING: D  GROOMING: D  FEEDING: setup A to min A  .  OT instructed/demonstrated pt caregiver the following with good understanding:    - energy conservation while performing bathing, dressing, and setup such as set clothing out night before, gather items required to perform task in one trip, sit while performing tasks, take rest breaks as needed, perform bathing on days with no other appointments or activities scheduled, etc.     -           change position at least every two hours and use pillows prevent skin break down/pressure ulcers. [facility] IADL: NT  .  BALANCE:  STATIC STANDING: NT due to pt not able and safety  DYNAMIC STANDING: NT due to pt not able and safety  . STATIC SITTING: good-  DYNAMIC SITTING: good-  . AMBULATION: pt nonambulatory  . EOB/BED TRANSFER: supine to/from sit D  CHAIR: NT due to pt unable and safety  TOILET/BSC: NT due to pt safety and not able  TUB SHOWER/SHOWER CHAIR/TUB BENCH: NT due to pt not able and safety  . -pt caregiver demonstrated good safety and technique bed transfer with pt. pt daughter reports tori lift has been ordered however has not yet arrived. [facility] PATIENT RESPONSE TO TREATMENT:  pt reports no increased discomfort or pain with activity throughout tx. PATIENT EDUCATION PROVIDED THIS VISIT: UB HEP, OT role, energy conservation, fall prevention/safety training ADL tasks, continue diet and medications as instructed per MD, consult MD or urgent care for medical assistance as opposed to ER unless situation emergent. PATIENT LEVEL OF UNDERSTANDING OF EDUCATION PROVIDED: pt caregiver verbalized good understanding energy conservation and UB HEP to A pt. REHAB POTENTIAL: pt caregiver declined OT. HOME EXERCISE PROGRAM: Written HEP of the following issued. pt caregiver instructed/demonstrated BUE shoulder press, shoulder flexion, shoulder abduction, shoulder extension, chest press, elbow flexion/extension x 5-10, x 2 per day. pt caregiver demonstrated I UB HEP. UB HEP for pt to maintain functional endurance and strength to perform ADL tasks and transfers to perform tasks with safety and for fall prevention. pt caregiver (daughter) reports performs B shoulder flex/abd, chest press, and elbow flex/ext with pt already on daily basis. ASSESSMENT: OT evaluation completed 8.15.22. pt caregivers declining OT due to being at functional baseline.  pt performing I/ADL tasks at prior level of D, transfers to perform functional tasks at prior level of D, pt nonambulatory, and caregivers demonstrate good safety/technique with pt while performing ADL tasks and bed transfer. pt caregiver providing same level of A for pt as prior to referral. pt caregiver demonstrated I UB HEP. pt resisted OT attempt at BUE ROM testing. pt followed simple 1 step commands with 10% accuracy with max v/c, demonstration, and tactile cues to follow commands. pt followed daughters commands more than OT with 50% accuracy. pt does not require OT at this time as is not able to and resists participation due to cognitive deficits, is at baseline level of care, and caregivers demonstrated good safety and technique with level of care that pt requires and which is provided. pt spouse and daughter agree D/C OT at this time as pt does not require it and is not appropriate. D/C pt to care of Dr Lesly Shepherd. PLAN: D/C OT. DISCHARGE PLANNING DISCUSSED WITH PT/CAREGIVER: D/C pt to self and family care under MD supervision. pt caregivers verbalized understanding and agree D/C.

## 2022-08-16 NOTE — CASE COMMUNICATION
Therapy Functional Score Assessment  Question   Score   Grooming  3       Upper Dressing 3      Lower Dressing 3      Bathing  6      Toilet Transfer  4    Transfer  5           Ambulation  5   Dyspnea                     2       Pain Interfering with activity 4  Est number therapy visits      1

## 2022-08-16 NOTE — CASE COMMUNICATION
OT evaluation completed 8.15.22. pt caregivers declining OT due to being at functional baseline. pt performing I/ADL tasks at prior level of D, transfers to perform functional tasks at prior level of D, pt nonambulatory, and caregivers demonstrate good safety/technique with pt while performing ADL tasks and bed transfer. pt caregiver providing same level of A for pt as prior to referral. pt caregiver demonstrated I UB HEP. pt resisted O T attempt at BUE ROM testing. pt followed simple 1 step commands with 10% accuracy with max v/c, demonstration, and tactile cues to follow commands. pt followed daughters commands more than OT with 50% accuracy. pt does not require OT at this time as is not able to and resists participation due to cognitive deficits, is at baseline level of care, and caregivers demonstrated good safety and technique with level of care that pt requires a nd which is provided. pt spouse and daughter agree D/C OT at this time as pt does not require it and is not appropriate. D/C pt to care of Dr Sylvain Oliveira.

## 2022-08-17 ENCOUNTER — HOME CARE VISIT (OUTPATIENT)
Dept: SCHEDULING | Facility: HOME HEALTH | Age: 87
End: 2022-08-17
Payer: MEDICARE

## 2022-08-17 PROCEDURE — G0157 HHC PT ASSISTANT EA 15: HCPCS

## 2022-08-18 ENCOUNTER — VIRTUAL VISIT (OUTPATIENT)
Dept: INTERNAL MEDICINE CLINIC | Age: 87
End: 2022-08-18
Payer: MEDICARE

## 2022-08-18 ENCOUNTER — HOME CARE VISIT (OUTPATIENT)
Dept: HOME HEALTH SERVICES | Facility: HOME HEALTH | Age: 87
End: 2022-08-18
Payer: MEDICARE

## 2022-08-18 ENCOUNTER — HOME CARE VISIT (OUTPATIENT)
Dept: SCHEDULING | Facility: HOME HEALTH | Age: 87
End: 2022-08-18
Payer: MEDICARE

## 2022-08-18 DIAGNOSIS — R63.0 POOR APPETITE: ICD-10-CM

## 2022-08-18 DIAGNOSIS — Z91.89 AT HIGH RISK FOR MALNUTRITION: ICD-10-CM

## 2022-08-18 DIAGNOSIS — R54 FRAILTY SYNDROME IN GERIATRIC PATIENT: ICD-10-CM

## 2022-08-18 DIAGNOSIS — Z78.9 IMPAIRED MOBILITY AND ADLS: ICD-10-CM

## 2022-08-18 DIAGNOSIS — N18.31 STAGE 3A CHRONIC KIDNEY DISEASE (HCC): ICD-10-CM

## 2022-08-18 DIAGNOSIS — R41.0 DELIRIUM: ICD-10-CM

## 2022-08-18 DIAGNOSIS — N39.0 RECURRENT UTI: ICD-10-CM

## 2022-08-18 DIAGNOSIS — Z91.89 AT HIGH RISK FOR DEHYDRATION: ICD-10-CM

## 2022-08-18 DIAGNOSIS — F03.91 DEMENTIA WITH BEHAVIORAL DISTURBANCE, UNSPECIFIED DEMENTIA TYPE: Primary | ICD-10-CM

## 2022-08-18 DIAGNOSIS — Z74.09 IMPAIRED MOBILITY AND ADLS: ICD-10-CM

## 2022-08-18 PROCEDURE — G0156 HHCP-SVS OF AIDE,EA 15 MIN: HCPCS

## 2022-08-18 PROCEDURE — 99443 PR PHYS/QHP TELEPHONE EVALUATION 21-30 MIN: CPT | Performed by: FAMILY MEDICINE

## 2022-08-18 RX ORDER — LEVOFLOXACIN 500 MG/1
500 TABLET, FILM COATED ORAL DAILY
Qty: 5 TABLET | Refills: 0 | Status: SHIPPED | OUTPATIENT
Start: 2022-08-18 | End: 2022-08-23

## 2022-08-18 NOTE — PROGRESS NOTES
Davis Bach (: 1933) is a 80 y.o. female seen for evaluation of the following chief concern(s):  Follow-up; Chronic condition management    The services today are being conducted using telemedicine which Davis Bach has consented to at the time of scheduling. ASSESSMENT/PLAN:  Diagnoses and all orders for this visit:    1. Dementia with behavioral disturbance, unspecified dementia type ([facility] [address]  -     REFERRAL TO HOSPICE    2. Delirium  -     REFERRAL TO HOSPICE    3. Impaired mobility and ADLs  -     REFERRAL TO HOSPICE    4. Stage 3a chronic kidney disease (HCC)  -     REFERRAL TO HOSPICE    5. Recurrent UTI  -     REFERRAL TO HOSPICE  -     levoFLOXacin (LEVAQUIN) 500 mg tablet; Take 1 Tablet by mouth daily for 5 days. 6. Poor appetite  -     REFERRAL TO HOSPICE    7. At high risk for dehydration  -     REFERRAL TO HOSPICE    8. At high risk for malnutrition    9. Frailty syndrome in geriatric patient  -     REFERRAL TO HOSPICE    It is very unfortunate that Mrs. 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 I have high index of suspicion that she sustained a stroke based on history and previous exam and possibility of paroxysmal Afib, however, pt ws unable to tolerate MRI to confirm this diagnosis. She has baseline impaired mobility, but is now being described as having poor tone and generalized weakness, now requiring assistance w/ iADLS such as feeding. It sounds like she is not wanting to eat- is at risk for malnutrition and dehydration, as well as aspiration.   I will treat her empirically w/ Levofloxacin (CrCl ~75 mL/min, recent ) to cover for urinary and lung infections, but I do not think this will result in a substantial recovery compared to her prior baseline- for that reason, I medically recommend for Hospice consultation for the above indications to evaluate and treat for comfort, quality and end of life care. Fransisca Lam and her family agree with plan as above and have no additional questions at this time. SUBJECTIVE/OBJECTIVE:  Mrs. David Haskins presents to clinic accompanied by her grandson/mPOA, Dr. Jacques Camara, who assist with HPI. Overall, generalized decline since last visit. For the past few days, pt has been reluctant to take meds and food, pocketing meds. Trying for lots of encouraging and maneuvers to help. She is doing better today- ate some, and able to spoon feed herself some. She seems to be leaning to the Left, generalized weakness in extremities/hands. PT has been initiated. OT did not see a benefit for their services given family support. Nurse Aid started this week, plan for 4 weeks of assistance. Limited HPI/ROS secondary to cognitive impairment. Per pt, she is not in any pain. She denies abd or urinary troubles, or resp distress. No acute concerns. Hx from last visit, 8/4/22; Overall, Mrs. Tayo Bosch condition is stable to improved, though cognition has not returned to baseline compared to ~6 months ago. Pt is less combative on the Sertraline and Quetiapine, family has had to administer an additional dose of Quetiapine rarely some nights for insomnia/agitation. She was not able to get the MRI secondary to agitation- family does not plan to re-visit this at this time. Since last visit, 6/28/22 ER evaluation for altered mental status, unclear etiology but symptoms self resolved. Home health orders from last visit were not processed as the agency is unable to accept \"R codes\" for the services requested. Her mobility has deteriorated, she is now requiring 2 person assist for transfers.     Family feels that pt would benefit from PT/OT/SLT and a home health aid- prefer [facility] in [place], as well as additional DME- specifically a Dejah lift given mobility impairment and elevated BMI for improved safety to pt and caregivers. She already has/uses a manual and motorized wheelchair. Approximate weight 340 Lbs- last weight at [facility], possibly some weight loss since that time. Additional pertinent hx;  Recent hospitalization at [facility] for AMS presumed secondary to UTI, hyponatremia (Na 128) and ELIZABETH, PAF, concern for TIA vs CVA- pt was not able to tolerate MRI at the time 2/2 agitation. Lasix was decreased form 40mg to 20mh, and Losartan was discontinued. No discharge summary available for review at this time. MRI brain scheduled for Friday. No ECHO is scheduled. Agitation between 5-8PM.  Discharged her on Olanzapine 10mg QHS; this seemed to help at first, added melatonin but persistent insomnia. Family hopeful HH for PT/OT/home health aid; order replaced. DM w/ nephropathy:  5/28/22 HA1C 8.0%  D/c on Glipizide, stopped after 3-4 days for BG to 80's. Had Podiatry visit last week, no neuropathy.       Past Medical History:   Diagnosis Date    ELIZABETH (acute kidney injury) ([facility] [address] 9/16/2013    Congestive heart failure (HCC)     Diabetes (HCC)     High cholesterol     Hypertension     Sepsis ([facility] [address] 9/16/2013     Past Surgical History:   Procedure Laterality Date    HX HYSTERECTOMY  1979     Family History   Problem Relation Age of Onset    Heart Disease Mother     Diabetes Sister     Hypertension Sister     Other Sister         brain tumor       Social History     Socioeconomic History    Marital status:      Spouse name: Not on file    Number of children: Not on file    Years of education: Not on file    Highest education level: Not on file   Occupational History    Not on file   Tobacco Use    Smoking status: Never    Smokeless tobacco: Never   Vaping Use    Vaping Use: Never used   Substance and Sexual Activity    Alcohol use: No    Drug use: Never    Sexual activity: Not on file   Other Topics Concern    Not on file   Social History Narrative    Not on file     Social Determinants of Health     Financial Resource Strain: Not on file   Food Insecurity: Not on file   Transportation Needs: Not on file   Physical Activity: Not on file   Stress: Not on file   Social Connections: Not on file   Intimate Partner Violence: Not on file   Housing Stability: Not on file     Social History     Tobacco Use   Smoking Status Never   Smokeless Tobacco Never       Current Outpatient Medications   Medication Sig Dispense Refill    levoFLOXacin (LEVAQUIN) 500 mg tablet Take 1 Tablet by mouth daily for 5 days. 5 Tablet 0    melatonin 10 mg tab Take 1 Tablet by mouth nightly. aspirin 81 mg chewable tablet Take 162 mg by mouth in the morning. FreeStyle Yao 14 Day Sensor kit USE TO TEST 3 TIMES A DAY AND AS NEEDED      dilTIAZem ER (Cartia XT) 120 mg capsule TAKE ONE CAPSULE BY MOUTH DAILY (Patient taking differently: Take 180 mg by mouth daily. TAKE ONE CAPSULE BY MOUTH DAILY) 90 Capsule 1    QUEtiapine (SEROquel) 25 mg tablet Half to one tablet QHS PRN agitation. (Patient taking differently: Take 25 mg by mouth daily as needed for PRN Reason (Other) (agitation). Half to one tablet daily as needed during bedtime agitation.) 90 Tablet 1    furosemide (LASIX) 20 mg tablet Take 1 Tablet by mouth in the morning. 90 Tablet 1    pravastatin (PRAVACHOL) 20 mg tablet Take 1 Tablet by mouth nightly. 90 Tablet 1    clotrimazole (LOTRIMIN) 1 % topical cream Apply  to affected area two (2) times a day. (Patient taking differently: Apply 1 Applicator to affected area two (2) times a day. both legs) 30 g 1    hydrocortisone (CORTAID) 1 % topical cream Apply  to affected area two (2) times a day. use thin layer (Patient taking differently: Apply 1 Applicator to affected area two (2) times daily as needed for Skin Irritation.  use thin layer on both legs) 60 g 1    nystatin (MYCOSTATIN) powder Apply  to affected area three (3) times daily. 1 Each 1    cetirizine (ZYRTEC) 10 mg tablet Take 10 mg by mouth daily. cholecalciferol (VITAMIN D3) (2,000 UNITS /50 MCG) cap capsule Take 2,000 Units by mouth daily. sertraline (ZOLOFT) 25 mg tablet Take 1 Tablet by mouth daily. 90 Tablet 0    ACCU-CHEK FASTCLIX LANCET DRUM misc USE ONE LANCET TO TEST TWICE A  Each 1    ACCU-CHEK SMARTVIEW TEST STRIP strip USE 1 STRIP TWO TIMES A DAY TO CHECK BLOOD SUGAR 100 Strip 8     No Known Allergies    There were no vitals taken for this visit. Physical exam:  GEN: Awake, alert, conversant with slurred and difficult to understand voice/speech, inattentive and disoriented. PULM: Unlabored respiration, no cough. Lab Results   Component Value Date/Time    WBC 8.1 06/28/2022 09:00 PM    HGB 11.9 (L) 06/28/2022 09:00 PM    HCT 36.6 06/28/2022 09:00 PM    PLATELET 079 77/14/8467 09:00 PM    MCV 86.9 06/28/2022 09:00 PM     Lab Results   Component Value Date/Time    Sodium 138 06/28/2022 09:00 PM    Potassium 4.2 06/28/2022 09:00 PM    Chloride 104 06/28/2022 09:00 PM    CO2 28 06/28/2022 09:00 PM    Anion gap 6 06/28/2022 09:00 PM    Glucose 140 (H) 06/28/2022 09:00 PM    BUN 16 06/28/2022 09:00 PM    Creatinine 1.18 06/28/2022 09:00 PM    BUN/Creatinine ratio 14 06/28/2022 09:00 PM    GFR est AA 52 (L) 06/28/2022 09:00 PM    GFR est non-AA 43 (L) 06/28/2022 09:00 PM    Calcium 10.1 06/28/2022 09:00 PM    Bilirubin, total 0.6 06/23/2022 03:21 PM    Alk.  phosphatase 82 06/23/2022 03:21 PM    Protein, total 7.4 06/23/2022 03:21 PM    Albumin 3.5 (L) 06/23/2022 03:21 PM    Globulin 4.6 (H) 11/07/2018 02:39 PM    A-G Ratio 0.9 (L) 06/23/2022 03:21 PM    ALT (SGPT) 23 06/23/2022 03:21 PM    AST (SGOT) 37 06/23/2022 03:21 PM     Lab Results   Component Value Date/Time    Cholesterol, total 119 09/17/2013 01:20 AM    HDL Cholesterol <10 (L) 09/17/2013 01:20 AM    LDL, calculated NOT CALCULATED DUE TO LOW HDLC LEVEL 09/17/2013 01:20 AM    VLDL, calculated  09/17/2013 01:20 AM     Calculation not valid with this patient's other Lipid values. Triglyceride 174 09/17/2013 01:20 AM    CHOL/HDL Ratio 11.9 (H) 09/17/2013 01:20 AM       On this date 08/18/2022 I have spent >25 minutes for face to face telephone encounter with the patient and her family discussing working diagnoses and treatment plan. Additional time was spent reviewing previous notes, test results. Medical decision making complexity: moderate-high.     Sal Agrawal MD   Family & Geriatric Medicine

## 2022-08-18 NOTE — PROGRESS NOTES
Patient grandson, Kathia Najera called and stated that his grandmother has now started to refuse taking her pills despite their efforts to crush them in her food and to encourage her to take the pills. The past two(2) days she is also refusing to eat with much opposition. It has become painful for them to attempt to force her to eat or take her pills. Family would like a referral to [facility] as her  and grandson feel like patient is just tired and wants to leave her days in peace as she wishes. Mendy Martinez presents today for No chief complaint on file. Depression Screening:  3 most recent PHQ Screens 8/4/2022   Little interest or pleasure in doing things Not at all   Feeling down, depressed, irritable, or hopeless Not at all   Total Score PHQ 2 0       Learning Assessment:  No flowsheet data found. Fall Risk  Fall Risk Assessment, last 12 mths 8/4/2022   Able to walk? Yes   Fall in past 12 months? 0   Do you feel unsteady? 0   Are you worried about falling 0   Is TUG test greater than 12 seconds? -   Is the gait abnormal? -   Number of falls in past 12 months -   Fall with injury? -       ADL  ADL Assessment 6/23/2022   Feeding yourself No Help Needed   Getting from bed to chair Help Needed   Getting dressed Help Needed   Bathing or showering Help Needed   Walk across the room (includes cane/walker) Help Needed   Using the telphone No Help Needed   Taking your medications Help Needed   Preparing meals Help Needed   Managing money (expenses/bills) Help Needed   Moderately strenuous housework (laundry) Help Needed   Shopping for personal items (toiletries/medicines) Help Needed   Shopping for groceries Help Needed   Driving Help Needed   Climbing a flight of stairs Help Needed   Getting to places beyond walking distances Help Needed       Health Maintenance reviewed and discussed and ordered per Provider.     Health Maintenance Due   Topic Date Due    Foot Exam Q1  Never done    MICROALBUMIN Q1  Never done    Eye Exam Retinal or Dilated  Never done    DTaP/Tdap/Td series (1 - Tdap) Never done    Shingrix Vaccine Age 50> (1 of 2) Never done    Bone Densitometry (Dexa) Screening  Never done    Medicare Yearly Exam  Never done    Pneumococcal 65+ years (2 - PPSV23 or PCV20) 10/30/2020   . Coordination of Care:  1. \"Have you been to the ER, urgent care clinic since your last visit? Hospitalized since your last visit? \" No    2. \"Have you seen or consulted any other health care providers outside of the [address] [place] [address] since your last visit? \" No     3. For patients aged 39-70: Has the patient had a colonoscopy? NA - based on age     If the patient is female:    4. For patients aged 41-77: Has the patient had a mammogram within the past 2 years? NA - based on age    11. For patients aged 21-65: Has the patient had a pap smear?  NA - based on age

## 2022-08-18 NOTE — Clinical Note
final D/C visit not completed due to pt admitted to Hospice services 8.18.22. D/C [facility] as of 8.18.22. .  OT visit 8.15.22:  \"OT evaluation completed 8.15.22. pt caregivers declining OT due to being at functional baseline. pt performing I/ADL tasks at prior level of D, transfers to perform functional tasks at prior level of D, pt nonambulatory, and caregivers demonstrate good safety/technique with pt while performing ADL tasks and bed transfer. pt caregiver providing same level of A for pt as prior to referral. pt caregiver demonstrated I UB HEP. pt resisted OT attempt at BUE ROM testing. pt followed simple 1 step commands with 10% accuracy with max v/c, demonstration, and tactile cues to follow commands. pt followed daughters commands more than OT with 50% accuracy. pt does not require OT at this time as is not able to and resists participation due to cognitive deficits, is at baseline level of care, and caregivers demonstrated good safety and technique with level of care that pt requires and which is provided. pt spouse and daughter agree D/C OT at this time as pt does not require it and is not appropriate. D/C pt to care of Dr Maurice Kirkland.

## 2022-08-19 ENCOUNTER — HOME CARE VISIT (OUTPATIENT)
Dept: HOSPICE | Facility: HOSPICE | Age: 87
End: 2022-08-19
Payer: MEDICARE

## 2022-08-19 ENCOUNTER — HOSPICE ADMISSION (OUTPATIENT)
Dept: HOSPICE | Facility: HOSPICE | Age: 87
End: 2022-08-19
Payer: MEDICARE

## 2022-08-19 VITALS
DIASTOLIC BLOOD PRESSURE: 73 MMHG | RESPIRATION RATE: 20 BRPM | SYSTOLIC BLOOD PRESSURE: 146 MMHG | HEART RATE: 70 BPM | TEMPERATURE: 98.1 F

## 2022-08-19 PROCEDURE — 0651 HSPC ROUTINE HOME CARE

## 2022-08-19 PROCEDURE — G0299 HHS/HOSPICE OF RN EA 15 MIN: HCPCS

## 2022-08-19 PROCEDURE — 3336500001 HSPC ELECTION

## 2022-08-20 ENCOUNTER — HOME CARE VISIT (OUTPATIENT)
Dept: HOSPICE | Facility: HOSPICE | Age: 87
End: 2022-08-20
Payer: MEDICARE

## 2022-08-20 PROCEDURE — G0299 HHS/HOSPICE OF RN EA 15 MIN: HCPCS

## 2022-08-20 PROCEDURE — 0651 HSPC ROUTINE HOME CARE

## 2022-08-20 NOTE — HOSPICE
Hospice Admission Summary  Mrs. Sharan Gaitan, 80year old female, admitted to Hospice services for a terminal diagnosis of Alzheimer's dementia. Patient has elected hospice services and is no longer seeking aggressive treatment. Co-morbidities related to the terminal diagnosis are debility. Patient also has a past medical history of Impaired mobility and ADLs, CKD stg3, UTI, malnutrition. Mrs. 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 Possibly sustained a stroke based on history, possibility of paroxysmal Afib, however, pt was unable to tolerate MRI to confirm this diagnosis. She has baseline impaired mobility, but is now being described as having poor tone and generalized weakness, now requiring assistance w/ ADLS such as feeding. It sounds like she is not wanting to eat- is at risk for malnutrition and dehydration, as well as aspiration. The patient/family is present and willing and safely able to provide care and administer medications, their availability is daily. The patient/family participated in goal setting, care planning, and are agreeable to the care plan. Admission booklet reviewed with patient/family; services provided under hospice benefit, review of rights and responsibilities, disposal of medications, contact information for , Joint Commission, Medicare, O, and outside resources for independence. The patient/family/caregiver/facility educated on IDT and their right to attend meetings. Education provided regarding 24-hour availability of hospice services and on-call number provided.     Attending physician:  Dr Shannon Munson Director:  Dr. Zion Miranda of Care:  Routine  Advance Directives:  [address]: NA  Allergies:  NKDA  MAC:  42cm  PPS:  20  FAST:  7d  NYHA:  N/A   EF%:  N/A  Tobacco usage:  N/A,  Functional status:  6/6  Infection:  N/A  Pain:  N/A   medications include Morphine, Lorazepam  Bowel Regimen:  Bisacodyl suppository  Lines, Drains, or Airways present: NA  See Care Plan for Intervention Orders  Wounds present: stage 1 pressure ulcer to sacrum, See wound care plan for intervention orders  Symptoms to monitor to maintain comfort:  Shortness of breath, pain  Hospice Aide Services Requested:  x2/wk  MSW Requested: yes   Requested: no  Volunteer Services Requested:  no  Bereavement risk/contact:  low  Patient/family/caregiver specific end of life goal: Peaceful transition  Training and education provided this visit: Hospice orientation  Plan for next visit:  24 hour follow up  Care coordination with Medical Director, IDG, and family regarding admission to hospice and all are in agreement with plan of care.

## 2022-08-21 PROCEDURE — 0651 HSPC ROUTINE HOME CARE

## 2022-08-22 ENCOUNTER — HOME CARE VISIT (OUTPATIENT)
Dept: HOSPICE | Facility: HOSPICE | Age: 87
End: 2022-08-22
Payer: MEDICARE

## 2022-08-22 VITALS
OXYGEN SATURATION: 98 % | HEART RATE: 71 BPM | DIASTOLIC BLOOD PRESSURE: 88 MMHG | SYSTOLIC BLOOD PRESSURE: 140 MMHG | TEMPERATURE: 98.2 F | RESPIRATION RATE: 17 BRPM

## 2022-08-22 PROCEDURE — 0651 HSPC ROUTINE HOME CARE

## 2022-08-22 NOTE — HOSPICE
The following standard precautions for infection control were utilized:  Mask  Patient was in bed and family was at bedside.  was very open and talkative and daughter was opening as well.  will visit family once a month.

## 2022-08-22 NOTE — HOSPICE
24-hour follow up. No complaints/requests at this time. Pt remains at base line. 24 hour hospcie number reinforced.

## 2022-08-22 NOTE — HOME HEALTH
SUBJECTIVE:Pts  reports his dtr has been doing exercises with pt when available and pt sits up 2-3x/day on EOB x 2-3 hrs at a time. CAREGIVER INVOLVEMENT/ASSISTANCE NEEDED FOR: pts  present during PT session and assist with all needs prn. Pts grandson also assist but at work during the day. HOME HEALTH SUPPLIES BY TYPE AND QUANTITY ORDERED/DELIVERED THIS VISIT INCLUDE: n/a  OBJECTIVE:  See interventions. 1.Patient level of understanding of education provided: Pt requires extra time/effort and is able to follow instructons with sitting therapeutic exercises with one step commands and visual cuing. 2.Patient response to treatment: Pt appeared fatigued after bed mobility and had increased difficulty following commands repeatedly attempting to lay back down. Pt did return to supine position after PT session. ASSESSMENT OF PROGRESS TOWARD GOALS: Pt has not yet received tori lift for transfers and will f/u with [place]. Pt continues to requires much assist with bed mobility and constant cuing with exercises. CONTINUED NEED FOR THE FOLLOWING SKILLS:Pt requires continue PT to improve LE strength/gait ability. PLAN FOR NEXT VISIT:Will plan to attempt transfers next visit as tolerated and reviewed HEP. THE FOLLOWING DISCHARGE PLANNING WAS DISCUSSED WITH THE PATIENT/CAREGIVER: Pt is scheduled to continue 1w3. Reviewed POC and PT goals with pt.

## 2022-08-23 ENCOUNTER — HOME CARE VISIT (OUTPATIENT)
Dept: SCHEDULING | Facility: HOME HEALTH | Age: 87
End: 2022-08-23
Payer: MEDICARE

## 2022-08-23 PROCEDURE — G0155 HHCP-SVS OF CSW,EA 15 MIN: HCPCS

## 2022-08-23 PROCEDURE — 0651 HSPC ROUTINE HOME CARE

## 2022-08-23 PROCEDURE — G0300 HHS/HOSPICE OF LPN EA 15 MIN: HCPCS

## 2022-08-23 PROCEDURE — G0156 HHCP-SVS OF AIDE,EA 15 MIN: HCPCS

## 2022-08-23 PROCEDURE — HOSPICE MEDICATION HC HH HOSPICE MEDICATION

## 2022-08-23 NOTE — HOSPICE
Reason for today's visit is to complete assessment. BROOKLYNN places call to inform that SW is on the way and speaks to thais Robertson. Aury Robertson states his grandfather is [facility] and has difficulty understanding things and Aury Robertson is pt's POA. BROOKLYNN explains the SW role on the team. Aury Robertson states he would like to talk via phone if that is possible. BROOKLYNN informs Aury Robertson that insurance does allow this for the SW discipline, and visit is conducted via phone. Pt is an 80year old female admitted to hospice on 8-19-22 with Alzheimer's dementia without behavioral disturbance. Pt lives in her own home with spouse and grandson Aury Robertson. Cole states pt is sleeping from 9pm to 9am, then sleeps from 1-4pm. Aury Robertson states pt is having to be fed and eats approximately 70% of soft meals. [place] reports pt had agitation before hospice admission, now Aury Robertson feels better as pt is more calm which reduces anxiety for Cole. Aury Robertson states he continues to work as well and may need [place] paperwork if pt's care progresses. Aury Robertson states he is considering hiring a private duty caregiver and provides his e-mail for SW to e-mail him a list of agencies. (Cole's e-mail: [email or web address]) Aury Robertson feels he is coping appropriately overall and states he has a good support system of family. Aury Robertson denies any further needs at this time and agrees for BROOKLYNN to follow each month to assess supportive needs.

## 2022-08-23 NOTE — Clinical Note
Bereavement assessment complete for thais Galvan who scores low on bereavement scale. Assessment sent to bereavement coordinator.

## 2022-08-24 VITALS
DIASTOLIC BLOOD PRESSURE: 78 MMHG | HEART RATE: 77 BPM | TEMPERATURE: 97.9 F | RESPIRATION RATE: 20 BRPM | OXYGEN SATURATION: 95 % | SYSTOLIC BLOOD PRESSURE: 128 MMHG

## 2022-08-24 PROCEDURE — 0651 HSPC ROUTINE HOME CARE

## 2022-08-24 NOTE — HOSPICE
Patient presents in bed in supine position, no signs of acute distress noted. Vitals within normal limits. No new concerns or questions per patient  at this time. Pain:Denies pain    Medication refills: None needed per patient . Comfort Kit being ordered for expected agitation    Medications reconciled and all medications are available in the home this visit. The following education was provided regarding medications, medication interactions, and look a like medications: Medication side effects, dosages, purposes, frequencies. Response to teaching: Verbalized understanding. Medications  are effective at this time. Supplies by type and quantity ordered/delivered this visit include: Ordered 2XL briefs, wipes and chux    Consulted attending physician regarding: Not needed this visit    Instructed patient and family on 24-hour hospice availability and phone number.

## 2022-08-25 PROCEDURE — 0651 HSPC ROUTINE HOME CARE

## 2022-08-26 ENCOUNTER — HOME CARE VISIT (OUTPATIENT)
Dept: SCHEDULING | Facility: HOME HEALTH | Age: 87
End: 2022-08-26
Payer: MEDICARE

## 2022-08-26 VITALS
OXYGEN SATURATION: 96 % | RESPIRATION RATE: 18 BRPM | DIASTOLIC BLOOD PRESSURE: 78 MMHG | SYSTOLIC BLOOD PRESSURE: 137 MMHG | HEART RATE: 70 BPM | TEMPERATURE: 98.5 F

## 2022-08-26 PROCEDURE — 0651 HSPC ROUTINE HOME CARE

## 2022-08-26 PROCEDURE — G0300 HHS/HOSPICE OF LPN EA 15 MIN: HCPCS

## 2022-08-26 NOTE — HOSPICE
Patient presents in bed in supine position. Patient denies pain at this time. Caregiver/ states that his daughter will be here prior to the hospital bed arriving between 1-4PM today. She will administer Haldol prior to the bath aide arriving today. Pain:Patient denies pain    Medication refills: None needed    Medications reconciled and all medications are available in the home this visit. The following education was provided regarding medications, medication interactions, and look a like medications: Medication side effects, dosages, purposes, frequencies. Response to teaching: Verbalized understanding. Medications  are effective at this time. Supplies by type and quantity ordered/delivered this visit include: Supplies delivered today    Consulted attending physician regarding: Not needed this visit.     Instructed patient and family on 24-hour hospice availability and phone number

## 2022-08-27 ENCOUNTER — HOME CARE VISIT (OUTPATIENT)
Dept: HOSPICE | Facility: HOSPICE | Age: 87
End: 2022-08-27
Payer: MEDICARE

## 2022-08-27 PROCEDURE — G0156 HHCP-SVS OF AIDE,EA 15 MIN: HCPCS

## 2022-08-27 PROCEDURE — 0651 HSPC ROUTINE HOME CARE

## 2022-08-28 PROCEDURE — 0651 HSPC ROUTINE HOME CARE

## 2022-08-28 NOTE — HOSPICE
The following standard precautions for infection control were utilized: Face Mask, Safety Glasses and Gloves. Care provided per established plan of care:  Yes. Patient is without complaints during care provided. Patient requests: N/A    Family/Caregiver requests:  N/A    Communicated to , Clinical Manager, or Director regarding patient/family/caregiver/aide findings:  N/A    Status of patient upon end of hospice aide visit:  Patient sitting up in her hospital bed with her spouse at her bedside.

## 2022-08-29 PROCEDURE — 0651 HSPC ROUTINE HOME CARE

## 2022-08-29 NOTE — HOME HEALTH
final D/C visit not completed due to pt admitted to Hospice services 8.18.22. D/C [facility] as of 8.18.22. .  OT visit 8.15.22:  \"OT evaluation completed 8.15.22. pt caregivers declining OT due to being at functional baseline. pt performing I/ADL tasks at prior level of D, transfers to perform functional tasks at prior level of D, pt nonambulatory, and caregivers demonstrate good safety/technique with pt while performing ADL tasks and bed transfer. pt caregiver providing same level of A for pt as prior to referral. pt caregiver demonstrated I UB HEP. pt resisted OT attempt at BUE ROM testing. pt followed simple 1 step commands with 10% accuracy with max v/c, demonstration, and tactile cues to follow commands. pt followed daughters commands more than OT with 50% accuracy. pt does not require OT at this time as is not able to and resists participation due to cognitive deficits, is at baseline level of care, and caregivers demonstrated good safety and technique with level of care that pt requires and which is provided. pt spouse and daughter agree D/C OT at this time as pt does not require it and is not appropriate. D/C pt to care of Dr Gio Hill.

## 2022-08-30 ENCOUNTER — HOME CARE VISIT (OUTPATIENT)
Dept: SCHEDULING | Facility: HOME HEALTH | Age: 87
End: 2022-08-30
Payer: MEDICARE

## 2022-08-30 VITALS
TEMPERATURE: 96.8 F | DIASTOLIC BLOOD PRESSURE: 93 MMHG | RESPIRATION RATE: 16 BRPM | SYSTOLIC BLOOD PRESSURE: 114 MMHG | HEART RATE: 62 BPM | OXYGEN SATURATION: 94 %

## 2022-08-30 PROCEDURE — G0299 HHS/HOSPICE OF RN EA 15 MIN: HCPCS

## 2022-08-30 PROCEDURE — 0651 HSPC ROUTINE HOME CARE

## 2022-08-30 PROCEDURE — G0156 HHCP-SVS OF AIDE,EA 15 MIN: HCPCS

## 2022-08-30 NOTE — HOSPICE
eats soft foods, 2/day  drinks okay, ice water per family member      Daughter RN, son a doctor    uses Pepe Rene  last use Friday, LBM Friday  discussed using daily to help patient go Marcus Erickson    daughter Juliette Showers for BM doesnt tell him. She comes everyother day. Asked him to ask daughter what she is giving so we call all be on the same page.     MOM tablets BID M/Th and then 1/3 mag citrate    Spoke w/ grandson    Lavern Wasserman    L/JASPAL 2X/3X 2-4

## 2022-08-31 ENCOUNTER — HOME CARE VISIT (OUTPATIENT)
Dept: HOSPICE | Facility: HOSPICE | Age: 87
End: 2022-08-31
Payer: MEDICARE

## 2022-08-31 PROCEDURE — 0651 HSPC ROUTINE HOME CARE

## 2022-09-01 DIAGNOSIS — F03.91 DEMENTIA WITH BEHAVIORAL DISTURBANCE, UNSPECIFIED DEMENTIA TYPE: ICD-10-CM

## 2022-09-01 DIAGNOSIS — R41.0 DELIRIUM: ICD-10-CM

## 2022-09-01 DIAGNOSIS — F41.9 ANXIETY: ICD-10-CM

## 2022-09-01 DIAGNOSIS — N39.0 RECURRENT UTI: Primary | ICD-10-CM

## 2022-09-01 DIAGNOSIS — K59.09 CHRONIC CONSTIPATION: ICD-10-CM

## 2022-09-01 PROCEDURE — 0651 HSPC ROUTINE HOME CARE

## 2022-09-01 RX ORDER — SERTRALINE HYDROCHLORIDE 25 MG/1
37.5 TABLET, FILM COATED ORAL DAILY
Qty: 135 TABLET | Refills: 0 | Status: SHIPPED | OUTPATIENT
Start: 2022-09-01

## 2022-09-01 RX ORDER — AMOXICILLIN 250 MG
1 CAPSULE ORAL DAILY
Qty: 90 TABLET | Refills: 0 | Status: SHIPPED | OUTPATIENT
Start: 2022-09-01

## 2022-09-01 RX ORDER — CEPHALEXIN 250 MG/1
250 CAPSULE ORAL DAILY
Qty: 90 CAPSULE | Refills: 0 | Status: SHIPPED | OUTPATIENT
Start: 2022-09-01 | End: 2022-11-30

## 2022-09-01 NOTE — PROGRESS NOTES
Prescriptions sent per Dr. Francisco J Álvarez ([facility])'s request based on portal messages;  Cephalexin 250mg every day for UTI ppx w/ hx of recurrent UTI  Senna-S for constipation  Sertraline 37.5mg every day for anxiety/neuropsychiatric symptoms associated w/ her dementia syndrome    Follow-up PRN for acute medical questions/concerns, shared care w/ Hospice.     Domenico Tellez MD

## 2022-09-02 ENCOUNTER — HOME CARE VISIT (OUTPATIENT)
Dept: SCHEDULING | Facility: HOME HEALTH | Age: 87
End: 2022-09-02
Payer: MEDICARE

## 2022-09-02 PROCEDURE — 0651 HSPC ROUTINE HOME CARE

## 2022-09-02 PROCEDURE — G0299 HHS/HOSPICE OF RN EA 15 MIN: HCPCS

## 2022-09-03 PROCEDURE — 0651 HSPC ROUTINE HOME CARE

## 2022-09-04 PROCEDURE — 0651 HSPC ROUTINE HOME CARE

## 2022-09-05 PROCEDURE — 0651 HSPC ROUTINE HOME CARE

## 2022-09-05 NOTE — HOSPICE
Denies pain  LBM Monday per     pt awake, alert pleasant  denies pain, behavior indicayors negative as well

## 2022-09-06 ENCOUNTER — HOME CARE VISIT (OUTPATIENT)
Dept: SCHEDULING | Facility: HOME HEALTH | Age: 87
End: 2022-09-06
Payer: MEDICARE

## 2022-09-06 VITALS
SYSTOLIC BLOOD PRESSURE: 118 MMHG | OXYGEN SATURATION: 95 % | DIASTOLIC BLOOD PRESSURE: 68 MMHG | RESPIRATION RATE: 18 BRPM | HEART RATE: 75 BPM | TEMPERATURE: 98.1 F

## 2022-09-06 PROCEDURE — G0156 HHCP-SVS OF AIDE,EA 15 MIN: HCPCS

## 2022-09-06 PROCEDURE — 0651 HSPC ROUTINE HOME CARE

## 2022-09-06 PROCEDURE — G0300 HHS/HOSPICE OF LPN EA 15 MIN: HCPCS

## 2022-09-06 NOTE — HOSPICE
Patient presents  lying in her hospital bed with eyes closed. Pain:Denies pain. Medication refills: Haldol and morphine    Medications reconciled and all medications are available in the home this visit. The following education was provided regarding medications, medication interactions, and look a like medications: Medication side effects, dosages, purposes, frequencies. Response to teaching: Caregiver verbalized understanding. . Medications  are effective at this time. Supplies by type and quantity ordered/delivered this visit include: Briefs XXl, wipes, gloves, barrier cream, syringes and foam booties Order #[id number]    Consulted attending physician regarding: Not needed this visit    Instructed patient and family on 24-hour hospice availability and phone number.

## 2022-09-07 PROCEDURE — 0651 HSPC ROUTINE HOME CARE

## 2022-09-08 PROCEDURE — 0651 HSPC ROUTINE HOME CARE

## 2022-09-09 ENCOUNTER — HOME CARE VISIT (OUTPATIENT)
Dept: SCHEDULING | Facility: HOME HEALTH | Age: 87
End: 2022-09-09
Payer: MEDICARE

## 2022-09-09 ENCOUNTER — HOME CARE VISIT (OUTPATIENT)
Dept: HOSPICE | Facility: HOSPICE | Age: 87
End: 2022-09-09
Payer: MEDICARE

## 2022-09-09 PROCEDURE — G0299 HHS/HOSPICE OF RN EA 15 MIN: HCPCS

## 2022-09-09 PROCEDURE — 0651 HSPC ROUTINE HOME CARE

## 2022-09-09 PROCEDURE — G0156 HHCP-SVS OF AIDE,EA 15 MIN: HCPCS

## 2022-09-09 NOTE — HOSPICE
The following standard precautions for infection control were utilized: Face Mask, Safety Glasses and Gloves. Care provided per established plan of care:  Yes. Patient is without complaints during care provided. Patient requests: N/A    Family/Caregiver requests:  N/A    Communicated to , Clinical Manager, or Director regarding patient/family/caregiver/aide findings:  Communicated with Marianne Michaud RN Case Manager. Status of patient upon end of hospice aide visit:  Patient laying in her hospital bed with her eyes opened.

## 2022-09-10 PROCEDURE — 0651 HSPC ROUTINE HOME CARE

## 2022-09-11 PROCEDURE — 0651 HSPC ROUTINE HOME CARE

## 2022-09-11 NOTE — HOSPICE
Patient in bed asleep in no apparent distress. Behavior indicators negative for pain and discomfort. Per spouse patient is eating pretty good. Patient eats breakfast around about 0900, oatmeal or grits. In the evening she eats greens, broccolor, chicken (fried or boiled). Eating about 2x/daily. Per  states that patient is drinking well. She drinks 550mL cup a daily. Patient arouses during conversation and informs this nurse that she drinks \"One, just one a day. \"  Patient inquired how patient was feeling. She stated \"just fine. \"  Writer inquired if she was comfortable. Patient stated, \"yes. \"  Writer asked her if she was hungry or thirsty. Patient responded \"nope\" to both. Per , her last bowel movement was Wednesday evening. Patient denies pain and discomfort stating, \"No, no way. Thank God for that. No way. \"   states that patient only complains of pain when you go to move her. He states right before you got here, I tried to move her leg and she started hollering before I even touched her. Per  patient is sleeping 2-3 hours nuring the day, and that patient sleeps all night. Patient normal goes to bed about 2100 and gets woken up about 0900. Per  he gives her medication her pills at 0900 and at 1700. He states that he gave her the Haldol this morning.  states he threw away the \"\" because he thought it was supposed to go in the trash. Mr. Romelia Franco states he is not aware of where the extra syringes are that writer order. He obtained the syringe from trash, washed it and writer prefill 0.5mL of Haldol in case it was needed when bath aide came.  very appreciative. Writer informed him that this nurse ordered extra syringes so that he can have medication lauri up in case he needed more than one. Mr. Romelia Franco states he doesn't draw up the medications. he states, \"My grandson does that because he is the doctor. \"  Writer informed him that this nurse spoke with grandson and we agreed to having extras. Writer informed spouse that this nurse would speak to grandson. Writer educated spouse what purple foam wedge and purple foam booties were for. Patient feet elevated on pillow with heels planted in the middle of the pillow. Wrier attempted to educate spouse about floating heels and he states \"this is how I do it. \"    When writer left home, sepped into vehicle and called grandson to update him on visit. Writer expressed concern that grandfather is confused. Writer informed grandson that patient spouse threw out the syringe for medication. Grandson asked why he did that. Writer informed him this nurse was unaware but educated him on not throwing them away. Livier Mayorga also informed him that this nurse asked about syringes ordered and  was unaware where they were. Writer spoke with Byron Pabon about finding the syringes so that they can be prefilled to ensure patient spouse had more than one syringe if needed. Byron Pabon agreed. Grandson asked this nurse why patient was given Haldol this morning. Writer explain that patient spouse stated that patient was agitated. Writer informed Ronaldrudy Camp that writer prefilled syringe so that  would have it if needed when the bath aide comes in. Writer discussed with Byron Pabon about his grandfather being reluctant to teaching and writer asked if he could assist the Hospice team with educating his grandfather. Writer also discussed possible patino catheter placement to assist with preventing patient skin breakdown as his grandfather is only using poise pads for incontience. Writer rosy Sanders about the diapers this nurse ordered. He states that he did not see any diapers in the supplies that were just delivered. Karli Vinod declined a catheter as he believes that patient will pull at it and possibly pull it out. Cole suggested a purewick.   Writer informed him that Hospice does not have purewicks and explained to him the expense other families have incurred to obtain and upkeep a purewick system. Linusdamaris Elvis states they are open to diapers if \"we can figure out where they are. \"  Writer informed him that should he change his mind at anytime, Hospice would place a patino in his grandmother.

## 2022-09-12 PROCEDURE — 0651 HSPC ROUTINE HOME CARE

## 2022-09-13 ENCOUNTER — HOME CARE VISIT (OUTPATIENT)
Dept: SCHEDULING | Facility: HOME HEALTH | Age: 87
End: 2022-09-13
Payer: MEDICARE

## 2022-09-13 PROCEDURE — 0651 HSPC ROUTINE HOME CARE

## 2022-09-13 PROCEDURE — G0300 HHS/HOSPICE OF LPN EA 15 MIN: HCPCS

## 2022-09-13 PROCEDURE — G0156 HHCP-SVS OF AIDE,EA 15 MIN: HCPCS

## 2022-09-14 VITALS
HEART RATE: 77 BPM | OXYGEN SATURATION: 95 % | SYSTOLIC BLOOD PRESSURE: 126 MMHG | TEMPERATURE: 97.7 F | RESPIRATION RATE: 18 BRPM | DIASTOLIC BLOOD PRESSURE: 68 MMHG

## 2022-09-14 PROCEDURE — 0651 HSPC ROUTINE HOME CARE

## 2022-09-14 NOTE — HOSPICE
Patient presents in bed on her left side. No signs of acute distress noted. Patient grandson asked this nurse to look at a spot on the patients right abdomen. Small red noted, [id number]. Zinc and foam applied for protection. Vitals within normal limits. Pain:Patient denies pain and is pleasant and talkative throughout visit    Medication refills: Senna    Medications reconciled and all medications are available in the home this visit. The following education was provided regarding medications, medication interactions, and look a like medications: Medication side effects, dosages, purposes, frequencies. Response to teaching: Verbalized understanding. Medications  are effective at this time. Supplies by type and quantity ordered/delivered this visit include: No supplies needed.     Consulted attending physician regarding: Not needed this visit    Instructed patient and family on 24-hour hospice availability and phone number

## 2022-09-15 ENCOUNTER — HOME CARE VISIT (OUTPATIENT)
Dept: SCHEDULING | Facility: HOME HEALTH | Age: 87
End: 2022-09-15
Payer: MEDICARE

## 2022-09-15 PROCEDURE — 0651 HSPC ROUTINE HOME CARE

## 2022-09-16 ENCOUNTER — HOME CARE VISIT (OUTPATIENT)
Dept: SCHEDULING | Facility: HOME HEALTH | Age: 87
End: 2022-09-16
Payer: MEDICARE

## 2022-09-16 ENCOUNTER — HOME CARE VISIT (OUTPATIENT)
Dept: HOSPICE | Facility: HOSPICE | Age: 87
End: 2022-09-16
Payer: MEDICARE

## 2022-09-16 VITALS
HEART RATE: 71 BPM | DIASTOLIC BLOOD PRESSURE: 63 MMHG | TEMPERATURE: 97.7 F | SYSTOLIC BLOOD PRESSURE: 119 MMHG | OXYGEN SATURATION: 93 % | RESPIRATION RATE: 17 BRPM

## 2022-09-16 PROCEDURE — G0299 HHS/HOSPICE OF RN EA 15 MIN: HCPCS

## 2022-09-16 PROCEDURE — 0651 HSPC ROUTINE HOME CARE

## 2022-09-16 PROCEDURE — G0156 HHCP-SVS OF AIDE,EA 15 MIN: HCPCS

## 2022-09-16 NOTE — HOSPICE
Upon arrival, patient resting in bed in no apparent distress on room air. Patient arouses to her name being called. Patietn denies any pain and discomfort. Just keeps repeating \"no\". \"Yesterday was a cold day\" per patient. Per  she is eating about the same, \"about one meal per day. \"  Per  patient is still sleeping well.  just keeps shaking her head up and down. When asked he states that patient \"drinks pretty good when she is not asleep.'  Per  patient is still sleeping \"pretty much the same. \"  He continues to say that she went to sleep yet and she used a lot of energy yesterday when her family came to visit. Patient daughter and granddaughter came to visit patient. They came in from [place]. Patient emerald states she must be tired from that. Patient intermittently laughs for no reason. Patient drifts back to sleep easily, but is easily arousable with verbal stimuli. Patient  asked this nurse to give patient 0.5mL of Haldol in preparation for patient bath.  states that he was not aware that there were prefilled syringes with Haldol. 9 prefilled syringes with 0.5mL of Haldol  and 2 empty syringes. LBM: yesterday morning per     Patient lying in bed with a chux underneath her and a poise pad between her legs. Per my conversation with grandson, family was using the 3XL diapers. Approximately 16mL of Morphine in bottle. Approximately 2mL of Haldol in bottle with prefilled syringes. Supplies needed: diapers, chux, creams.   Ordered through [employer]: [id number]

## 2022-09-17 PROCEDURE — 0651 HSPC ROUTINE HOME CARE

## 2022-09-18 PROCEDURE — 0651 HSPC ROUTINE HOME CARE

## 2022-09-19 PROCEDURE — 0651 HSPC ROUTINE HOME CARE

## 2022-09-20 ENCOUNTER — HOME CARE VISIT (OUTPATIENT)
Dept: SCHEDULING | Facility: HOME HEALTH | Age: 87
End: 2022-09-20
Payer: MEDICARE

## 2022-09-20 PROCEDURE — G0299 HHS/HOSPICE OF RN EA 15 MIN: HCPCS

## 2022-09-20 PROCEDURE — G0156 HHCP-SVS OF AIDE,EA 15 MIN: HCPCS

## 2022-09-20 PROCEDURE — HOSPICE MEDICATION HC HH HOSPICE MEDICATION

## 2022-09-20 PROCEDURE — 0651 HSPC ROUTINE HOME CARE

## 2022-09-21 PROCEDURE — 0651 HSPC ROUTINE HOME CARE

## 2022-09-21 NOTE — HOSPICE
Patient awake and alert. When asks how she is doing, she says, \"Just fine. \"       Patient denies vital signs. Patient says no to everything.  tries to coerce her, patient says \"no thank you. \"   says just put it in her mouth. Adeola Ramires LPN informs the  that we cannot force the patient to take medicine. He argues, \"I thought you was the nurse. \"   squirts it in her mouth. Patient is very agitated and raises her hand at [facility] staff and her . Patient is uncooperative with Hospice staff doing a skin check or vital signs. Patient says \"no\" to everything.  tries to get wife to allow staff to look at her, patient tells , \"let them look at you. \"   tells staff, \"Just do it. \"  Writer explained to  that if the patient tells staff no, we cannot just continue on.  made patient allow staff to look at her skin. LBM: Saturday Morning        6 (0.5mL) prefilled Haldol syringes; 4 empty syringes. approximately 2mL in bottle.         Medications needed: Haldol  Confirmation number: [id number]

## 2022-09-22 PROCEDURE — 0651 HSPC ROUTINE HOME CARE

## 2022-09-23 ENCOUNTER — HOME CARE VISIT (OUTPATIENT)
Dept: HOSPICE | Facility: HOSPICE | Age: 87
End: 2022-09-23
Payer: MEDICARE

## 2022-09-23 ENCOUNTER — HOME CARE VISIT (OUTPATIENT)
Dept: SCHEDULING | Facility: HOME HEALTH | Age: 87
End: 2022-09-23
Payer: MEDICARE

## 2022-09-23 PROCEDURE — G0156 HHCP-SVS OF AIDE,EA 15 MIN: HCPCS

## 2022-09-23 PROCEDURE — G0299 HHS/HOSPICE OF RN EA 15 MIN: HCPCS

## 2022-09-23 PROCEDURE — 0651 HSPC ROUTINE HOME CARE

## 2022-09-24 PROCEDURE — 0651 HSPC ROUTINE HOME CARE

## 2022-09-25 PROCEDURE — 0651 HSPC ROUTINE HOME CARE

## 2022-09-26 PROCEDURE — 0651 HSPC ROUTINE HOME CARE

## 2022-09-27 ENCOUNTER — HOME CARE VISIT (OUTPATIENT)
Dept: SCHEDULING | Facility: HOME HEALTH | Age: 87
End: 2022-09-27
Payer: MEDICARE

## 2022-09-27 VITALS — DIASTOLIC BLOOD PRESSURE: 64 MMHG | OXYGEN SATURATION: 96 % | HEART RATE: 69 BPM | SYSTOLIC BLOOD PRESSURE: 112 MMHG

## 2022-09-27 PROCEDURE — G0156 HHCP-SVS OF AIDE,EA 15 MIN: HCPCS

## 2022-09-27 PROCEDURE — G0299 HHS/HOSPICE OF RN EA 15 MIN: HCPCS

## 2022-09-27 PROCEDURE — 0651 HSPC ROUTINE HOME CARE

## 2022-09-27 NOTE — HOSPICE
Patient awake and alert, sitting up in bed. Patient denies pain and discomfort. Behavior indicators negative as well. Daughter informs this nurse that Haldol was given prior to writer's visit. Patient agreeable to tasks writer needs to perform. Daughter informed this nurse that patient is taking Keflex prophilactically until 11/30 and then her PCP to reassess. Writer discussed with daughter about patient abdominal wound being covered with a foam dressing for protection as the skin is not open. Writer also discussed patient gluteal fold wound. Writer informed her that this nurse is putting calmoseptine on it as Xeroform would keep the wound too wet and cause further skin breakdown. Writer also informed her that foam dressings are not being utilized as they would require frequent changing and could possibly peel intact skin from wound area. Daughter verbalized agreement. Daughter assisted this nurse with rolling patient and visualizing wound. Writer obtained photo for reference in chart. Writer discussed with daughter about haivng patient roll fully onto her side as that would get her off the wound and assist with healing. Writer also discussed placing a patino for the patient. Writer informed her it was discussed with grandson previously but he did not want it. Daughter and this nurse discussed the benefits of a catheter for patient skin and for caregiver. Daughter stated that she thought it was a good idea and that she would discuss it with grandson and one of them would let this nurse. Writer asked daughter if she was okay with writer ordering one with supplies to have in the home just in case. Daughter agreed. 3 - prefill 0.5mL Haldol 7 empty syringes. 2mL in one Haldol bottle; 30mL in new bottle. Supplies ordered through [employer]. COnfirmation number: [id number]    Senna refill ordered through [facility] for home delivery.   Confirmation number [id number]

## 2022-09-28 ENCOUNTER — HOME CARE VISIT (OUTPATIENT)
Dept: HOSPICE | Facility: HOSPICE | Age: 87
End: 2022-09-28
Payer: MEDICARE

## 2022-09-28 PROCEDURE — 0651 HSPC ROUTINE HOME CARE

## 2022-09-29 PROCEDURE — 0651 HSPC ROUTINE HOME CARE

## 2022-09-30 ENCOUNTER — HOME CARE VISIT (OUTPATIENT)
Dept: HOSPICE | Facility: HOSPICE | Age: 87
End: 2022-09-30
Payer: MEDICARE

## 2022-09-30 ENCOUNTER — HOME CARE VISIT (OUTPATIENT)
Dept: SCHEDULING | Facility: HOME HEALTH | Age: 87
End: 2022-09-30
Payer: MEDICARE

## 2022-09-30 VITALS
SYSTOLIC BLOOD PRESSURE: 137 MMHG | HEART RATE: 75 BPM | DIASTOLIC BLOOD PRESSURE: 58 MMHG | TEMPERATURE: 96.9 F | OXYGEN SATURATION: 98 % | RESPIRATION RATE: 18 BRPM

## 2022-09-30 PROCEDURE — 0651 HSPC ROUTINE HOME CARE

## 2022-09-30 PROCEDURE — G0155 HHCP-SVS OF CSW,EA 15 MIN: HCPCS

## 2022-09-30 PROCEDURE — G0299 HHS/HOSPICE OF RN EA 15 MIN: HCPCS

## 2022-09-30 PROCEDURE — G0156 HHCP-SVS OF AIDE,EA 15 MIN: HCPCS

## 2022-09-30 NOTE — HOSPICE
Reason for rogerio's visit is to assess supportive needs, hiring private duty, and FMLA needs. BROOKLYNN places call and speaks to grandson. Emilyrob Miller reports he is coping well at this time and denies any emotional distress himself and family. Emily Miller states they deided not to hire private duty aides because pt is getting a catheter and this will ease much of the caregiving for the spouse. Emily Miller reports pt does get agitated from time to time and mediacations are effective. Emily Miller states pt will pocket food but enjoys soft foods. Emily Miller denies pt having issues with swallowing. Emily Angela states he will be starting a new job at the begining of the year and may need FMLA papers when this happens. BROOKLYNN encourages Cole to call hospice with any needs, and Cole verbalizes understanding, Emily Miller agrees for BROOKLYNN to continue to follow monthly.

## 2022-09-30 NOTE — HOSPICE
Medication refills ordered this visit:none at this time. Medications reconciled and all medications are/are not available in the home this visit. The following education was provided regarding medications, medication interactions, and look alike medication. [place] Response to teaching: appropriate. Medications are effective at this time. Supplies by type and quantity ordered this visit include: none at this time. Consulted NP regarding addint PRN milk of mag to med regime. New order added. Instructed patient/family/caregiver on 24-hour hospice availability and phone number. Plan for next visit: assessment, symptom management, provide support and education to patient's family/caregivers. 16fr patino catheter placed during visit, positive urine return. Family educated on catheter care. Verbalized understanding.

## 2022-10-01 VITALS
RESPIRATION RATE: 18 BRPM | HEART RATE: 63 BPM | DIASTOLIC BLOOD PRESSURE: 67 MMHG | OXYGEN SATURATION: 97 % | TEMPERATURE: 97.2 F | SYSTOLIC BLOOD PRESSURE: 121 MMHG

## 2022-10-01 PROCEDURE — 0651 HSPC ROUTINE HOME CARE

## 2022-10-01 NOTE — HOSPICE
Upon arrival patient resting in bed comfortably, patient is pleasant and speaking with me and the other nurse Terry Jones during this visit, caregiver reports that she is happy today, patient tolerated assessment no distress, caregiver present in home, advised caegiver to call 24 hr on call number if needed, questions/concerns answered during visit, will continue to monitor.

## 2022-10-02 PROCEDURE — 0651 HSPC ROUTINE HOME CARE

## 2022-10-03 PROCEDURE — 0651 HSPC ROUTINE HOME CARE

## 2022-10-04 ENCOUNTER — HOME CARE VISIT (OUTPATIENT)
Dept: SCHEDULING | Facility: HOME HEALTH | Age: 87
End: 2022-10-04
Payer: MEDICARE

## 2022-10-04 PROCEDURE — G0156 HHCP-SVS OF AIDE,EA 15 MIN: HCPCS

## 2022-10-04 PROCEDURE — G0299 HHS/HOSPICE OF RN EA 15 MIN: HCPCS

## 2022-10-04 PROCEDURE — 0651 HSPC ROUTINE HOME CARE

## 2022-10-05 PROCEDURE — 0651 HSPC ROUTINE HOME CARE

## 2022-10-05 NOTE — HOSPICE
Patient in bed with bath aide bedside getting her dressed. Patient being very fiesty, yelling at  and speaking frankly to bath aide. Patient denies any pain and discomfort. Patient behavior indicators negative for pain and discomfort.  states thatshe is  he gave her \"some of that medicine\" before the bath aide got here. Patient states that Alissa Barber is going to get him. \"  Patient states something about wanting to fight and resist bath aide and  trying to put her clothes on. Writer discussed patient agitation and this nurse forgoing vital signs until Friday as to not make her more agitated.  agreed with this nurse. Per  patient is eating \"pretty fair. \"  Per , patient will drink \"when she is woke. \"  He states he gives her water, diet soda. patient has blood glucose monitor on her left deltoid.  denies any medication or supply needs.       (6) 0.5mL prefilled Haldol syringes; 4 empty syringes  Haldol - approximately 30mL in bottle  Morphine - approximately 16mL

## 2022-10-06 PROCEDURE — 0651 HSPC ROUTINE HOME CARE

## 2022-10-07 ENCOUNTER — HOME CARE VISIT (OUTPATIENT)
Dept: SCHEDULING | Facility: HOME HEALTH | Age: 87
End: 2022-10-07
Payer: MEDICARE

## 2022-10-07 PROCEDURE — G0156 HHCP-SVS OF AIDE,EA 15 MIN: HCPCS

## 2022-10-07 PROCEDURE — G0300 HHS/HOSPICE OF LPN EA 15 MIN: HCPCS

## 2022-10-07 PROCEDURE — 0651 HSPC ROUTINE HOME CARE

## 2022-10-07 NOTE — HOSPICE
Patient presents in bed watching TV. Patient is agitated and combative. While taking vital signs patient tried to bite this nurse. Unable to obtain vitals. Second dose of Haldol given as HHA is on her way to bathe the patient. Patient  states that the patient is not like this often, mainly when hospice nurse and aides are in the home. Medication refills: None needed    Medications reconciled and all medications are available in the home this visit. Education provided regarding medications, medication interactions, and look a like medications: Medication side effects, dosages, purposes, frequencies. Response to teaching: Verbalized understanding. . Medications  are effective at this time. Supplies by type and quantity ordered/delivered this visit include: Zinc left in the home    Consulted attending physician regarding: Not needed this visit. Instructed patient and family on 24-hour hospice availability and phone number.

## 2022-10-08 PROCEDURE — 0651 HSPC ROUTINE HOME CARE

## 2022-10-09 PROCEDURE — 0651 HSPC ROUTINE HOME CARE

## 2022-10-10 PROCEDURE — 0651 HSPC ROUTINE HOME CARE

## 2022-10-11 ENCOUNTER — HOME CARE VISIT (OUTPATIENT)
Dept: SCHEDULING | Facility: HOME HEALTH | Age: 87
End: 2022-10-11
Payer: MEDICARE

## 2022-10-11 PROCEDURE — G0156 HHCP-SVS OF AIDE,EA 15 MIN: HCPCS

## 2022-10-11 PROCEDURE — 0651 HSPC ROUTINE HOME CARE

## 2022-10-11 PROCEDURE — G0299 HHS/HOSPICE OF RN EA 15 MIN: HCPCS

## 2022-10-11 NOTE — HOSPICE
The following standard precautions for infection control were utilized:  Mask  Patient was in bed with spouse bu her side. She was talking but the conversation was not coherent. Will continue to follow up with , family and .

## 2022-10-12 VITALS — TEMPERATURE: 97.6 F

## 2022-10-12 PROCEDURE — 0651 HSPC ROUTINE HOME CARE

## 2022-10-12 NOTE — HOSPICE
Patient resting in bed in no apparent distress on room air. Patient arouses to verbal stimuli. Patient denies pain and discomfort. Patient behavior indicators negative for pain and discomfort as well.  states he gave her a \"shot of that medicine this morning. \"        Per , patient is eating \"good\". He states that she eats everything that he brings her  Per , patient is sleeping good. I thought you put it in a bag    When writer tried to take patient vital signs, patient became very agitated and kept saying \"no\".  attempted to assist this nurse and patient kept stating \"Let me alone. \"   was pleading with patient telling her that the hospice staff is coming to check her to let them check her. Writer discussed with patient  to medicate patient with Haldol about 20 minutes prior to nurse arrival.   states he will do that. \"That's all they are going to tell me. \"  Patient laughs intermittently. patient oral mucosa appears dry when she speaks. Patient mumbles consistently, writer unable to understand most of what patient is saying.  states that her voice is very low. Alex Andersen get anything from me. Haldol about 15mL in bottle  8 prefilled syinges with 1mL of Haldol. One empty syringe.       Morphine about 16mL in bottle     denies needing any supplies at this time

## 2022-10-13 PROCEDURE — 0651 HSPC ROUTINE HOME CARE

## 2022-10-14 ENCOUNTER — HOME CARE VISIT (OUTPATIENT)
Dept: SCHEDULING | Facility: HOME HEALTH | Age: 87
End: 2022-10-14
Payer: MEDICARE

## 2022-10-14 PROCEDURE — G0299 HHS/HOSPICE OF RN EA 15 MIN: HCPCS

## 2022-10-14 PROCEDURE — 0651 HSPC ROUTINE HOME CARE

## 2022-10-14 PROCEDURE — G0156 HHCP-SVS OF AIDE,EA 15 MIN: HCPCS

## 2022-10-14 RX ORDER — FUROSEMIDE 20 MG/1
20 TABLET ORAL DAILY
Qty: 90 TABLET | Refills: 1 | Status: SHIPPED | OUTPATIENT
Start: 2022-10-14

## 2022-10-15 PROCEDURE — 0651 HSPC ROUTINE HOME CARE

## 2022-10-16 PROCEDURE — 0651 HSPC ROUTINE HOME CARE

## 2022-10-17 VITALS — TEMPERATURE: 98 F

## 2022-10-17 PROCEDURE — 0651 HSPC ROUTINE HOME CARE

## 2022-10-17 NOTE — HOSPICE
Patient refused vitals. She told this nurse \"no\". Writer informed  that this nurse could not let her refuse to flush the catheter. Catheter flushed with 60mL, line flushed with 60mL. 9 prefilled syringes with 1mL of Haldol. Approximately 10mL in Haldol bottle  Approximately 16mL in Morphine bottle.  denies any supply needs.

## 2022-10-18 ENCOUNTER — HOME CARE VISIT (OUTPATIENT)
Dept: SCHEDULING | Facility: HOME HEALTH | Age: 87
End: 2022-10-18
Payer: MEDICARE

## 2022-10-18 PROCEDURE — G0299 HHS/HOSPICE OF RN EA 15 MIN: HCPCS

## 2022-10-18 PROCEDURE — G0156 HHCP-SVS OF AIDE,EA 15 MIN: HCPCS

## 2022-10-18 PROCEDURE — 0651 HSPC ROUTINE HOME CARE

## 2022-10-19 ENCOUNTER — HOME CARE VISIT (OUTPATIENT)
Dept: HOSPICE | Facility: HOSPICE | Age: 87
End: 2022-10-19
Payer: MEDICARE

## 2022-10-19 VITALS
HEART RATE: 80 BPM | DIASTOLIC BLOOD PRESSURE: 78 MMHG | TEMPERATURE: 99.1 F | RESPIRATION RATE: 17 BRPM | SYSTOLIC BLOOD PRESSURE: 85 MMHG

## 2022-10-19 PROCEDURE — G0155 HHCP-SVS OF CSW,EA 15 MIN: HCPCS

## 2022-10-19 PROCEDURE — 0651 HSPC ROUTINE HOME CARE

## 2022-10-19 NOTE — HOSPICE
Reason for today's visit is to assess supportive needs and FMLA needs from [employer]. SW places call to Zora per his request. Zora states pt \"is do alright,\" and reports pt's appetite varies. Zora reports that pt tends to eat less when she is constipated, and pt has agitation more so on days when care is provided. Zora states pt continues to have joint pain and Voltaren gel is effective. Zora states that as of now, his [place] paperwork is ok and his new employer has not mentioned anything to him about it being updated. Zora states he wants to see how pt does throughout the rest of this year, and he will contact this SW if paperwork needs to be completed. Zora denies any emotional distress at this time and states he will call hospice with any needs. Zora agrees for SW to continue to follow.

## 2022-10-19 NOTE — HOSPICE
Patient resting in bed in no apparent distress. Patient arouses easily to verbal stimuli. Patient states \"no\" when asked if she is in pain. Patient behavior indicators negative for pain as well.  informs this nurse that he gave her the \"shot in the mouth\" (Haldol 1mL) at about 0900. Patient initial told this nurse no to doing assessment. After a few minutes of speaking with spouse, asking for his assistance, patient allowed this nurse to conduct assessment. Patient was stating things writer could not understanding and making faces while writer doing so. Patient spouse assisted this nurse in conducting skin assessment. Patient gluteal fold wound is smaller than previous assessment. Writer suggested to patient  to continue with calmospetine or zinc treatment. Patient has a small piece of cloth in between thighs. patient spouse states that he placed that there to prevent her legs from rubbing or sticking together. Reposition wedge moved from patient right side to her left side. Patient left appearing comfortable and drifting off to sleep. 8 1mL prefilled syringes of Haldol  [id number] in Haldol Bottle  Morphine approximately 16mL in bottle. Spouse denies any medication needs. Grandson stated need for supplies only. Supplies needed (Per grandson): Just wipes. Per  need smooth and cool barrier cream.  Supplies ordered through [employer].   Confirmation number: [id number]

## 2022-10-20 PROCEDURE — 0651 HSPC ROUTINE HOME CARE

## 2022-10-21 ENCOUNTER — HOME CARE VISIT (OUTPATIENT)
Dept: SCHEDULING | Facility: HOME HEALTH | Age: 87
End: 2022-10-21
Payer: MEDICARE

## 2022-10-21 PROCEDURE — 0651 HSPC ROUTINE HOME CARE

## 2022-10-21 PROCEDURE — G0156 HHCP-SVS OF AIDE,EA 15 MIN: HCPCS

## 2022-10-21 PROCEDURE — G0299 HHS/HOSPICE OF RN EA 15 MIN: HCPCS

## 2022-10-22 VITALS
OXYGEN SATURATION: 95 % | DIASTOLIC BLOOD PRESSURE: 63 MMHG | TEMPERATURE: 98.1 F | RESPIRATION RATE: 15 BRPM | SYSTOLIC BLOOD PRESSURE: 97 MMHG | HEART RATE: 69 BPM

## 2022-10-22 PROCEDURE — 0651 HSPC ROUTINE HOME CARE

## 2022-10-22 NOTE — HOSPICE
not acting herself - had to give 2mLs of Haldol.  fighting [place] for    Went to sleep and hasnt been awake since [phone] when bath aide left. Grandtalita asked to check patient skin fold on legs as he thinks she has a fungal thing going on. Patient has a rash on her inner thighs. Writer spoke with patient spouse who is keeping a cloth between her legs \"to keep her legs from sticking\". Writer explained to him that that cloth is keeping any moisture between patient thighs which could be leading to the rash and skin breakdown between her thighs. Per conversation with grandtalita, they have been putting Clotrimazole Cream USP 1% on her thighs. Writer visualized patient sacral area,smaller than previous visit. Some of the same discoloration on front of inner thighs also on the back side. Patient catheter tip appears discolored. Almost like a blue color. Patient LBM Wednesday morning. Grandson denies any medication or supply needs. Patient spouse showed this nurse the supplies that came. Writer informed him that an extra catheter and flushing supplies werer ordered to have on hand in the home.       7 prefilled 1mL haldol syringes  Approximately 10mL in Haldol bottle  Approximately 16mL of Morphine in bottle

## 2022-10-23 PROCEDURE — 0651 HSPC ROUTINE HOME CARE

## 2022-10-24 PROCEDURE — 0651 HSPC ROUTINE HOME CARE

## 2022-10-25 ENCOUNTER — HOME CARE VISIT (OUTPATIENT)
Dept: SCHEDULING | Facility: HOME HEALTH | Age: 87
End: 2022-10-25
Payer: MEDICARE

## 2022-10-25 PROCEDURE — 0651 HSPC ROUTINE HOME CARE

## 2022-10-25 PROCEDURE — G0156 HHCP-SVS OF AIDE,EA 15 MIN: HCPCS

## 2022-10-26 ENCOUNTER — HOME CARE VISIT (OUTPATIENT)
Dept: SCHEDULING | Facility: HOME HEALTH | Age: 87
End: 2022-10-26
Payer: MEDICARE

## 2022-10-26 VITALS
HEART RATE: 69 BPM | OXYGEN SATURATION: 95 % | RESPIRATION RATE: 18 BRPM | DIASTOLIC BLOOD PRESSURE: 60 MMHG | TEMPERATURE: 98.5 F | SYSTOLIC BLOOD PRESSURE: 112 MMHG

## 2022-10-26 PROCEDURE — G0300 HHS/HOSPICE OF LPN EA 15 MIN: HCPCS

## 2022-10-26 PROCEDURE — 0651 HSPC ROUTINE HOME CARE

## 2022-10-26 PROCEDURE — HOSPICE MEDICATION HC HH HOSPICE MEDICATION

## 2022-10-26 NOTE — HOSPICE
Patient presents in bed with eyes closed. No signs of discomfort. Vitals within normal limits. Caregiver/grandson requested the patients patino be flushed today. Urine noted to be cedrick colored and cloudy with sediment. Catheter flushed easily with 60 mL NS. No other concerns or issues at this time               Medication refills: None needed    Medications reconciled and all medications are available in the home this visit. The following education was provided regarding medications, medication interactions, and look a like medications: Medication side effects, dosages, purposes, frequencies. Response to teaching: Verbalized understanding. Medications  are effective at this time. Supplies by type and quantity ordered/delivered this visit include: No supplies needed at this time. Consulted attending physician regarding: Not Needed    Instructed patient and family on 24-hour hospice availability and phone number.

## 2022-10-27 PROCEDURE — 0651 HSPC ROUTINE HOME CARE

## 2022-10-28 ENCOUNTER — HOME CARE VISIT (OUTPATIENT)
Dept: SCHEDULING | Facility: HOME HEALTH | Age: 87
End: 2022-10-28
Payer: MEDICARE

## 2022-10-28 VITALS
OXYGEN SATURATION: 96 % | HEART RATE: 75 BPM | SYSTOLIC BLOOD PRESSURE: 135 MMHG | DIASTOLIC BLOOD PRESSURE: 83 MMHG | TEMPERATURE: 98.3 F | RESPIRATION RATE: 18 BRPM

## 2022-10-28 PROCEDURE — 0651 HSPC ROUTINE HOME CARE

## 2022-10-28 PROCEDURE — G0300 HHS/HOSPICE OF LPN EA 15 MIN: HCPCS

## 2022-10-28 PROCEDURE — G0156 HHCP-SVS OF AIDE,EA 15 MIN: HCPCS

## 2022-10-28 NOTE — HOSPICE
Patient presents in her hospital bed watching TV. No complaints of pain. Vitals within normal limits. Upon assessment patient noted to have an unstagable pressure injury to her left heel. Skin prep applied to the area and heels floated. ospice Team and POA notified. Medication refills: No refills noted. Medications reconciled and all medications are available in the home this visit. The following education was provided regarding medications, medication interactions, and look a like medications: Medication side effects, dosages, purposes, frequencies. Response to teaching: Verbalized understanding. Medications  are effective at this time. Supplies by type and quantity ordered/delivered this visit include: No supplies needed this visit    Consulted attending physician regarding: Not needed this visit. Instructed patient and family on 24-hour hospice availability and phone number.

## 2022-10-29 PROCEDURE — 0651 HSPC ROUTINE HOME CARE

## 2022-10-30 PROCEDURE — 0651 HSPC ROUTINE HOME CARE

## 2022-10-31 PROCEDURE — 0651 HSPC ROUTINE HOME CARE

## 2022-11-01 ENCOUNTER — HOME CARE VISIT (OUTPATIENT)
Dept: SCHEDULING | Facility: HOME HEALTH | Age: 87
End: 2022-11-01
Payer: MEDICARE

## 2022-11-01 VITALS
SYSTOLIC BLOOD PRESSURE: 102 MMHG | DIASTOLIC BLOOD PRESSURE: 63 MMHG | OXYGEN SATURATION: 95 % | HEART RATE: 76 BPM | RESPIRATION RATE: 15 BRPM

## 2022-11-01 PROCEDURE — G0299 HHS/HOSPICE OF RN EA 15 MIN: HCPCS

## 2022-11-01 PROCEDURE — 0651 HSPC ROUTINE HOME CARE

## 2022-11-01 PROCEDURE — HOSPICE MEDICATION HC HH HOSPICE MEDICATION

## 2022-11-01 PROCEDURE — G0156 HHCP-SVS OF AIDE,EA 15 MIN: HCPCS

## 2022-11-02 PROCEDURE — 0651 HSPC ROUTINE HOME CARE

## 2022-11-03 ENCOUNTER — HOME CARE VISIT (OUTPATIENT)
Dept: HOSPICE | Facility: HOSPICE | Age: 87
End: 2022-11-03
Payer: MEDICARE

## 2022-11-03 PROCEDURE — 0651 HSPC ROUTINE HOME CARE

## 2022-11-03 NOTE — HOSPICE
Nikole Williamson 62 y o  female MRN: [id number]    Encounter: [id number]      Assessment/Plan     1  Type 2 DM c/b euglycemic DKA - referral to CDE for MNT counseling and diabetes education  Will hold SGLT2i indefinitely  Patient with interest in trying alternate GLP1 therapy  Previously intolerant of semaglutide, will trial dulaglutide  I reviewed the side effects of these therapies and provided LAKE Linekong some anticipatory guidance for managing common GI symptoms  Recommend continuing metformin  2  Adrenal myelolipoma - adrenal myelolipoma's contain no medullary or cortical tissue and are not hormonally active, although coexisting hyperfunctioning adrenocortical adenomas can be observed, with exception of pheochromocytoma  Problem List Items Addressed This Visit        Endocrine    Diabetic ketoacidosis without coma associated with type 2 diabetes mellitus ([facility] [address] )    Relevant Medications    Dulaglutide (Trulicity) 8 57 QM/4 1YV SOPN      Other Visit Diagnoses     Type 2 diabetes mellitus without complication, without long-term current use of insulin ([facility])    -  Primary    Relevant Medications    Dulaglutide (Trulicity) 0 28 HT/4 2TI SOPN    Other Relevant Orders    Basic metabolic panel Lab Collect    Cortisol Level, AM Specimen Lab Collect    HEMOGLOBIN A1C W/ EAG ESTIMATION Lab Collect    Lipid panel Lab Collect Lab Collect    Microalbumin / creatinine urine ratio Lab Collect    C-peptide Lab Collect    Ambulatory referral to Diabetic Education    Elevated beta-hydroxybutyrate        Myelolipoma        Relevant Orders    ACTH- Lab Collect    DHEA-sulfate Lab Collect    Aldosterone/Renin Ratio            CC: Diabetes    History of Present Illness     HPI:    LAKE Linekong presents today for evaluation of diabetes  She was recently discharged from [facility] with euglycemic DKA  She reports 10+ year history of diabetes with no known complications, including DPN, DR, kidney disease, heart disease or stroke  The following standard precautions for infection control were utilized:  MAsk  Patient was resting in the bed with spouse at her side. Spouse stated that he is taking it one day at a time and that he had some help coming town today.  will folow up with family and patient. 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 [address] )    • High cholesterol    • Hypertension      Past Surgical History:   Procedure Laterality Date   • HYSTERECTOMY       Social History   Social History     Substance and Sexual Activity   Alcohol Use Never     Social History     Substance and Sexual Activity   Drug Use Never     Social History     Tobacco Use   Smoking Status Former Smoker   Smokeless Tobacco Never Used     Family History:   Family History   Problem Relation Age of Onset   • Diabetes Mother    • Diabetes Father        Meds/Allergies   Current Outpatient Medications   Medication Sig Dispense Refill   • Adult Aspirin Regimen 81 MG EC tablet TAKE ONE TABLET BY MOUTH EVERY DAY 90 tablet 1   • amLODIPine (NORVASC) 5 mg tablet TAKE ONE TABLET BY MOUTH EVERY DAY 90 tablet 1   • atorvastatin (LIPITOR) 10 mg tablet TAKE ONE TABLET BY MOUTH EVERY DAY 90 tablet 1   • Dulaglutide (Trulicity) 9 97 OP/1 0ST SOPN Inject 0 5 mL (0 75 mg total) under the skin once a week 2 mL 1   • folic acid (FOLVITE) 434 MCG tablet Take 0 5 tablets (400 mcg total) by mouth daily 90 tablet 1   • glucose blood (Accu-Chek Guide) test strip Test twice daily for fluctuating blood sugars 100 strip 3   • lisinopril (ZESTRIL) 40 mg tablet TAKE ONE TABLET BY MOUTH EVERY DAY 90 tablet 1   • metFORMIN (GLUCOPHAGE) 1000 MG tablet Take 1 tablet (1,000 mg total) by mouth 2 (two) times a day with meals Do not start before October 24, 2022   0     No current facility-administered medications for this visit  Allergies   Allergen Reactions   • Jardiance [Empagliflozin] GI Intolerance   • Ozempic (0 25 Or 0 5 Mg-Dose) [Semaglutide(0 25 Or 0 5mg-Dos)] GI Intolerance       Objective   Vitals: Blood pressure 145/85, pulse (!) 110, height 5' 11" (1 803 m), weight 93 4 kg (206 lb)  Physical Exam  Vitals reviewed  Constitutional:       General: She is not in acute distress  HENT:      Head: Normocephalic and atraumatic  Eyes:      General: No scleral icterus  Conjunctiva/sclera: Conjunctivae normal    Cardiovascular:      Rate and Rhythm: Normal rate and regular rhythm  Pulmonary:      Effort: Pulmonary effort is normal  No respiratory distress  Abdominal:      Palpations: Abdomen is soft  Musculoskeletal:      Right lower leg: No edema  Left lower leg: No edema  Skin:     General: Skin is warm and dry  Neurological:      General: No focal deficit present  Mental Status: She is alert  Psychiatric:         Mood and Affect: Mood normal          Behavior: Behavior normal          The history was obtained from the review of the chart, patient      Lab Results:   Lab Results   Component Value Date/Time    Hemoglobin A1C 7 3 (A) 09/16/2022 10:09 AM    Hemoglobin A1C 7 3 (A) 03/18/2022 09:15 AM    WBC 8 10 10/22/2022 04:51 AM    WBC 10 44 (H) 10/21/2022 07:17 AM    WBC 11 96 (H) 02/10/2022 11:45 AM    Hemoglobin 14 2 10/22/2022 04:51 AM Hemoglobin 16 4 (H) 10/21/2022 07:17 AM    Hemoglobin 16 0 (H) 02/10/2022 11:45 AM    Hematocrit 42 8 10/22/2022 04:51 AM    Hematocrit 49 2 (H) 10/21/2022 07:17 AM    Hematocrit 48 8 (H) 02/10/2022 11:45 AM    MCV 97 10/22/2022 04:51 AM    MCV 97 10/21/2022 07:17 AM    MCV 96 02/10/2022 11:45 AM    Platelets 605 50/58/2650 04:51 AM    Platelets 610 24/87/8896 07:17 AM    Platelets 020 13/52/9029 11:45 AM    BUN 14 10/22/2022 04:51 AM    BUN 14 10/21/2022 07:37 PM    BUN 17 10/21/2022 10:52 AM    Potassium 3 9 10/22/2022 04:51 AM    Potassium 3 7 10/21/2022 07:37 PM    Potassium 4 2 10/21/2022 10:52 AM    Chloride 108 10/22/2022 04:51 AM    Chloride 106 10/21/2022 07:37 PM    Chloride 103 10/21/2022 10:52 AM    CO2 22 10/22/2022 04:51 AM    CO2 20 (L) 10/21/2022 07:37 PM    CO2 18 (L) 10/21/2022 10:52 AM    Creatinine 0 66 10/22/2022 04:51 AM    Creatinine 0 78 10/21/2022 07:37 PM    Creatinine 0 61 10/21/2022 10:52 AM    AST 17 10/22/2022 04:51 AM    AST 16 10/21/2022 07:17 AM    AST 19 02/10/2022 11:45 AM    ALT 29 10/22/2022 04:51 AM    ALT 31 10/21/2022 07:17 AM    ALT 38 02/10/2022 11:45 AM    Albumin 3 7 10/22/2022 04:51 AM    Albumin 4 7 10/21/2022 07:17 AM    Albumin 4 6 02/10/2022 11:45 AM       PRESENTING LABS TO [facility]    Component      Latest Ref Rng & Units 10/21/2022   Sodium      135 - 147 mmol/L 138   Potassium      3 5 - 5 3 mmol/L 3 7   Chloride      96 - 108 mmol/L 101   CO2      21 - 32 mmol/L 19 (L)   Anion Gap      4 - 13 mmol/L 18 (H)   BUN      5 - 25 mg/dL 20   Creatinine      0 60 - 1 30 mg/dL 0 68   Glucose, Random      65 - 140 mg/dL 163 (H)   Calcium      8 3 - 10 1 mg/dL 10 0   AST      5 - 45 U/L 16   ALT      12 - 78 U/L 31   Alkaline Phosphatase      46 - 116 U/L 68   Total Protein      6 4 - 8 4 g/dL 8 1   Albumin      3 5 - 5 0 g/dL 4 7   TOTAL BILIRUBIN      0 20 - 1 00 mg/dL 0 43   eGFR      ml/min/1 73sq m 96   pH, Frandy      7 300 - 7 400 7 328   pCO2, Frandy      42 0 - 50 0 mm Hg 30 2 (L)   pO2, Frandy      35 0 - 45 0 mm Hg 56 9 (H)   HCO3, Frandy      24 - 30 mmol/L 15 5 (L)   Base Excess, Frandy      mmol/L -8 9   O2 Content, Frandy      ml/dL 19 7   O2 HGB, VENOUS      60 0 - 80 0 % 86 5 (H)   LACTIC ACID      0 5 - 2 0 mmol/L 1 4   BETA-HYDROXYBUTYRATE      <0 6 mmol/L 5 0 (H)         Imaging Studies: I have personally reviewed pertinent reports  10/21/2022    CT ABDOMEN AND PELVIS WITH IV CONTRAST     INDICATION:   Epigastric pain  recurrent epigastric to RUQ abd pain over past 3-4 wk  +RUQ TTP  hysterectomy        COMPARISON:  CT 2/10/2022      TECHNIQUE:  CT examination of the abdomen and pelvis was performed  Axial, sagittal, and coronal 2D reformatted images were created from the source data and submitted for interpretation      Radiation dose length product (DLP) for this visit:  1021 67 mGy-cm   This examination, like all CT scans performed in the [place], was performed utilizing techniques to minimize radiation dose exposure, including the use of   iterative reconstruction and automated exposure control      IV Contrast:  100 mL of iohexol (OMNIPAQUE)  Enteric Contrast:  Enteric contrast was not administered      FINDINGS:     ABDOMEN     LOWER CHEST:  No clinically significant abnormality identified in the visualized lower chest      LIVER/BILIARY TREE:  Unchanged hepatomegaly with fatty infiltration  No CT evidence of suspicious hepatic mass  Normal hepatic contours  No biliary dilatation      GALLBLADDER:  No calcified gallstones  No pericholecystic inflammatory change      SPLEEN:  Unremarkable      PANCREAS:  Unremarkable      ADRENAL GLANDS:  Unchanged 2 4 x 2 2 cm fat density nodule in the right adrenal gland compatible with myelolipoma      KIDNEYS/URETERS:  Unremarkable   No hydronephrosis      STOMACH AND BOWEL:  There is colonic diverticulosis without evidence of acute diverticulitis      APPENDIX:  No findings to suggest appendicitis      ABDOMINOPELVIC CAVITY:  No ascites  No pneumoperitoneum  No lymphadenopathy      VESSELS:  Atherosclerotic changes are present  No evidence of aneurysm      PELVIS     REPRODUCTIVE ORGANS:  Surgical changes of prior hysterectomy      URINARY BLADDER:  Unremarkable      ABDOMINAL WALL/INGUINAL REGIONS:  There is a small fat-containing umbilical hernia, unchanged from previous examination      OSSEOUS STRUCTURES:  No acute fracture or destructive osseous lesion      IMPRESSION:     No acute pathology      Colonic diverticulosis without diverticulitis      Hepatomegaly with fatty infiltration        Portions of the record may have been created with voice recognition software  Occasional wrong word or "sound a like" substitutions may have occurred due to the inherent limitations of voice recognition software  Read the chart carefully and recognize, using context, where substitutions have occurred

## 2022-11-04 ENCOUNTER — HOME CARE VISIT (OUTPATIENT)
Dept: SCHEDULING | Facility: HOME HEALTH | Age: 87
End: 2022-11-04
Payer: MEDICARE

## 2022-11-04 PROCEDURE — 0651 HSPC ROUTINE HOME CARE

## 2022-11-04 PROCEDURE — G0299 HHS/HOSPICE OF RN EA 15 MIN: HCPCS

## 2022-11-04 PROCEDURE — G0156 HHCP-SVS OF AIDE,EA 15 MIN: HCPCS

## 2022-11-05 PROCEDURE — 0651 HSPC ROUTINE HOME CARE

## 2022-11-05 NOTE — HOSPICE
Patient resting in bed in no apparent distress on room air. Patient says \"uh huh\" when asked if she is in pain. Patient behavior indicators negative for pain and discomforr. Patient moans intermittently and makes faces when bath aide gives her a bath. Patient visibly thinner in both her legs, abdomen area and lower arms.

## 2022-11-06 PROCEDURE — 0651 HSPC ROUTINE HOME CARE

## 2022-11-07 PROCEDURE — 0651 HSPC ROUTINE HOME CARE

## 2022-11-08 ENCOUNTER — HOME CARE VISIT (OUTPATIENT)
Dept: SCHEDULING | Facility: HOME HEALTH | Age: 87
End: 2022-11-08
Payer: MEDICARE

## 2022-11-08 PROCEDURE — G0156 HHCP-SVS OF AIDE,EA 15 MIN: HCPCS

## 2022-11-08 PROCEDURE — G0299 HHS/HOSPICE OF RN EA 15 MIN: HCPCS

## 2022-11-08 PROCEDURE — 0651 HSPC ROUTINE HOME CARE

## 2022-11-09 VITALS — RESPIRATION RATE: 18 BRPM | DIASTOLIC BLOOD PRESSURE: 82 MMHG | SYSTOLIC BLOOD PRESSURE: 128 MMHG

## 2022-11-09 PROCEDURE — 0651 HSPC ROUTINE HOME CARE

## 2022-11-10 ENCOUNTER — VIRTUAL VISIT (OUTPATIENT)
Dept: INTERNAL MEDICINE CLINIC | Age: 87
End: 2022-11-10
Payer: MEDICARE

## 2022-11-10 DIAGNOSIS — I50.32 DIASTOLIC CHF, CHRONIC (HCC): ICD-10-CM

## 2022-11-10 DIAGNOSIS — F41.9 ANXIETY: Primary | ICD-10-CM

## 2022-11-10 DIAGNOSIS — I48.0 PAROXYSMAL A-FIB (HCC): ICD-10-CM

## 2022-11-10 DIAGNOSIS — I89.0 LYMPHEDEMA: ICD-10-CM

## 2022-11-10 DIAGNOSIS — N39.0 RECURRENT UTI: ICD-10-CM

## 2022-11-10 DIAGNOSIS — Z51.5 HOSPICE CARE PATIENT: ICD-10-CM

## 2022-11-10 DIAGNOSIS — F03.918 DEMENTIA WITH BEHAVIORAL DISTURBANCE: ICD-10-CM

## 2022-11-10 PROCEDURE — 99442 PR PHYS/QHP TELEPHONE EVALUATION 11-20 MIN: CPT | Performed by: FAMILY MEDICINE

## 2022-11-10 RX ORDER — SERTRALINE HYDROCHLORIDE 50 MG/1
50 TABLET, FILM COATED ORAL DAILY
Qty: 90 TABLET | Refills: 2 | Status: SHIPPED | OUTPATIENT
Start: 2022-11-10

## 2022-11-10 RX ORDER — DILTIAZEM HYDROCHLORIDE 120 MG/1
CAPSULE, COATED, EXTENDED RELEASE ORAL
Qty: 90 CAPSULE | Refills: 2 | Status: SHIPPED | OUTPATIENT
Start: 2022-11-10

## 2022-11-10 RX ORDER — FUROSEMIDE 20 MG/1
20 TABLET ORAL
Qty: 90 TABLET | Refills: 2 | Status: SHIPPED | OUTPATIENT
Start: 2022-11-11

## 2022-11-10 RX ORDER — CEPHALEXIN 250 MG/1
250 CAPSULE ORAL DAILY
Qty: 90 CAPSULE | Refills: 2 | Status: SHIPPED | OUTPATIENT
Start: 2022-11-10 | End: 2023-02-08

## 2022-11-10 RX ORDER — QUETIAPINE FUMARATE 25 MG/1
TABLET, FILM COATED ORAL
Qty: 90 TABLET | Refills: 2 | Status: SHIPPED | OUTPATIENT
Start: 2022-11-10

## 2022-11-10 NOTE — HOSPICE
Per spouse, patient eats 2 meals per day and drinks \"enough\". Patient appears to be losing weight visibly in her face, abdominal area and lower extremities. On patient bedside table, there are 3 syringes each labeled \"water\", \"juice\" and \"Ensure\" . Per  he is using them to give her drink, \"that is the only way to do it. \"  He states that she will not drink with a straw anylonger. Writer attempted to educated him that patient could aspirate while giving her fluids like that, he walked away stating \"that is the only way to do it. I kow what I am doing, I push it slow. \"              Meds:  3 - 1mL Haldol and 1-0.5mL Haldol prefilled syringes  4 - empty syringes  30mL bottle of Haldol  Approximately 16mL in bottle    Supplies:  XL gloves, syrnges for cathter, catheter,

## 2022-11-10 NOTE — PROGRESS NOTES
Sunita Nathan (: 1933) is a 80 y.o. female seen for evaluation of the following chief concern(s):  Follow-up; Chronic condition management    The services today are being conducted using telemedicine which Sunita Nathan has consented to at the time of scheduling. ASSESSMENT/PLAN:  Diagnoses and all orders for this visit:    1. Anxiety  -     sertraline (ZOLOFT) 50 mg tablet; Take 1 Tablet by mouth daily. 2. Dementia with behavioral disturbance  -     QUEtiapine (SEROquel) 25 mg tablet; One tablet QHS PRN agitation. Indications: repeated episodes of anxiety    3. Paroxysmal A-fib (HCC)  -     dilTIAZem ER (Cartia XT) 120 mg capsule; TAKE ONE CAPSULE BY MOUTH DAILY  Indications: high blood pressure    4. Recurrent UTI  -     cephALEXin (KEFLEX) 250 mg capsule; Take 1 Capsule by mouth daily for 90 days. Indications: an infection of the skin and the tissue below the skin    5. Lymphedema    6. Diastolic CHF, chronic (HCC)  -     furosemide (LASIX) 20 mg tablet; Take 1 Tablet by mouth every Monday, Wednesday, Friday. Indications: visible water retention    7. Hospice care patient    Mrs. 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 I have high index of suspicion that she sustained a stroke based on history and previous exam and possibility of paroxysmal Afib, however, pt ws unable to tolerate MRI to confirm this diagnosis. She has baseline impaired mobility, but is now being described as having poor tone and generalized weakness, now requiring assistance w/ all iADLS and she has developed skin break-down. She has excellent family support. Increase Sertraline for anxious symptoms. Refills for chronic conditions medications as above. Shared care w/ Hospice. Follow-up as needed.       SUBJECTIVE/OBJECTIVE:  Mrs. Aster Virk presents to clinic accompanied by her grandson/mPOA, Dr. Lupis Leonard, who assist with HPI. Overall, continued decline. She remains enrolled in Hospice; twice weekly visits. No pain. Increased anxiety. More trouble w/ talking/communication. She is now requiring a puree/liquid diet 2/2 her pocketing food, meds are being crushed/grinded and given in yogurt. She is using a patino at this time- just changed- looks clean, light brown urine. She is doing well w/ ppx Keflex, furosemide MWF. She has developed a sacral and buttock ulcer. Limited HPI/ROS secondary to cognitive impairment. Hx from last visit, 8/4/22; Overall, Mrs. Carlos Shipman condition is stable to improved, though cognition has not returned to baseline compared to ~6 months ago. Pt is less combative on the Sertraline and Quetiapine, family has had to administer an additional dose of Quetiapine rarely some nights for insomnia/agitation. She was not able to get the MRI secondary to agitation- family does not plan to re-visit this at this time. Since last visit, 6/28/22 ER evaluation for altered mental status, unclear etiology but symptoms self resolved. Home health orders from last visit were not processed as the agency is unable to accept \"R codes\" for the services requested. Her mobility has deteriorated, she is now requiring 2 person assist for transfers. Family feels that pt would benefit from PT/OT/SLT and a home health aid- prefer [facility] in [place], as well as additional DME- specifically a Dejah lift given mobility impairment and elevated BMI for improved safety to pt and caregivers. She already has/uses a manual and motorized wheelchair. Approximate weight 340 Lbs- last weight at [facility], possibly some weight loss since that time. Additional pertinent hx;  Recent hospitalization at [facility] for AMS presumed secondary to UTI, hyponatremia (Na 128) and ELIZABETH, PAF, concern for TIA vs CVA- pt was not able to tolerate MRI at the time 2/2 agitation.  Lasix was decreased form 40mg to 20mh, and Losartan was discontinued. No discharge summary available for review at this time. MRI brain scheduled for Friday. No ECHO is scheduled. Agitation between 5-8PM.  Discharged her on Olanzapine 10mg QHS; this seemed to help at first, added melatonin but persistent insomnia. Family hopeful HH for PT/OT/home health aid; order replaced. DM w/ nephropathy:  5/28/22 HA1C 8.0%  D/c on Glipizide, stopped after 3-4 days for BG to 80's. Had Podiatry visit last week, no neuropathy.       Past Medical History:   Diagnosis Date    ELIZABETH (acute kidney injury) ([facility] [address] 9/16/2013    Congestive heart failure (HCC)     Diabetes (HCC)     High cholesterol     Hypertension     Sepsis ([facility] [address] 9/16/2013     Past Surgical History:   Procedure Laterality Date    HX HYSTERECTOMY  1979     Family History   Problem Relation Age of Onset    Heart Disease Mother     Diabetes Sister     Hypertension Sister     Other Sister         brain tumor       Social History     Socioeconomic History    Marital status:      Spouse name: Not on file    Number of children: Not on file    Years of education: Not on file    Highest education level: Not on file   Occupational History    Not on file   Tobacco Use    Smoking status: Never    Smokeless tobacco: Never   Vaping Use    Vaping Use: Never used   Substance and Sexual Activity    Alcohol use: No    Drug use: Never    Sexual activity: Not on file   Other Topics Concern    Not on file   Social History Narrative    Not on file     Social Determinants of Health     Financial Resource Strain: Not on file   Food Insecurity: Not on file   Transportation Needs: Not on file   Physical Activity: Not on file   Stress: Not on file   Social Connections: Not on file   Intimate Partner Violence: Not on file   Housing Stability: Not on file     Social History     Tobacco Use   Smoking Status Never   Smokeless Tobacco Never       Current Outpatient Medications   Medication Sig Dispense Refill sertraline (ZOLOFT) 50 mg tablet Take 1 Tablet by mouth daily. 90 Tablet 2    QUEtiapine (SEROquel) 25 mg tablet One tablet QHS PRN agitation. Indications: repeated episodes of anxiety 90 Tablet 2    dilTIAZem ER (Cartia XT) 120 mg capsule TAKE ONE CAPSULE BY MOUTH DAILY  Indications: high blood pressure 90 Capsule 2    cephALEXin (KEFLEX) 250 mg capsule Take 1 Capsule by mouth daily for 90 days. Indications: an infection of the skin and the tissue below the skin 90 Capsule 2    furosemide (LASIX) 20 mg tablet Take 1 Tablet by mouth every Monday, Wednesday, Friday. Indications: visible water retention 90 Tablet 2    nystatin (MYCOSTATIN) powder Apply  to affected area three (3) times daily. 1 Each 5    pravastatin (PRAVACHOL) 20 mg tablet Take 1 Tablet by mouth nightly. 90 Tablet 1    magnesium hydroxide (MILK OF MAGNESIA) 400 mg/5 mL suspension Take 30 mL by mouth daily. PRN daily for constipation  Indications: constipation      haloperidol (HALDOL) 2 mg/mL oral concentrate Take 1 mg by mouth every six (6) hours as needed for Agitation. Indications: delirium 15ml 0    senna-docusate (Senna-S) 8.6-50 mg per tablet Take 1 Tablet by mouth daily. 90 Tablet 0    polyethylene glycol (MIRALAX) 17 gram packet Take 17 g by mouth daily. Indications: constipation      bisacodyL (DULCOLAX) 10 mg supp Insert 10 mg into rectum daily as needed for Constipation. Indications: constipation 6 supp 0    LORazepam (ATIVAN) 0.5 mg tablet Take 0.5 mg by mouth every six (6) hours as needed for Agitation or Anxiety. Indications: anxious 60 tablets 0    morphine (ROXANOL) 100 mg/5 mL (20 mg/mL) concentrated solution Take 5 mg by mouth every three (3) hours as needed for Pain or Shortness of Breath. Indications: pain and shortness of breath 15ml 0    melatonin 10 mg tab Take 1 Tablet by mouth nightly. Indications: sleep      aspirin 81 mg chewable tablet Take 162 mg by mouth daily.  Indications: treatment to prevent a heart attack, stroke prevention      cetirizine (ZYRTEC) 10 mg tablet Take 10 mg by mouth daily. Indications: inflammation of the nose due to an allergy      acetaminophen (TYLENOL) 650 mg suppository Insert 650 mg into rectum every six (6) hours as needed for Fever. Indications: fever (Patient not taking: Reported on 11/10/2022) 4 supp 0    hyoscyamine SL (LEVSIN/SL) 0.125 mg SL tablet Take 0.125 mg by mouth every four (4) hours as needed for Secretions. Indications: excessive saliva production (Patient not taking: Reported on 11/10/2022) 12 tablets 0    prochlorperazine (COMPAZINE) 10 mg tablet Take 10 mg by mouth every six (6) hours as needed for Nausea or Vomiting. Indications: nausea and vomiting (Patient not taking: Reported on 11/10/2022) 6 tablets 0     No Known Allergies    There were no vitals taken for this visit. Physical exam:  GEN: Awake, alert, conversant with slurred and difficult to understand voice/speech, inattentive and disoriented. PULM: Unlabored respiration, no cough. Lab Results   Component Value Date/Time    WBC 8.1 06/28/2022 09:00 PM    HGB 11.9 (L) 06/28/2022 09:00 PM    HCT 36.6 06/28/2022 09:00 PM    PLATELET 263 36/87/2916 09:00 PM    MCV 86.9 06/28/2022 09:00 PM     Lab Results   Component Value Date/Time    Sodium 138 06/28/2022 09:00 PM    Potassium 4.2 06/28/2022 09:00 PM    Chloride 104 06/28/2022 09:00 PM    CO2 28 06/28/2022 09:00 PM    Anion gap 6 06/28/2022 09:00 PM    Glucose 140 (H) 06/28/2022 09:00 PM    BUN 16 06/28/2022 09:00 PM    Creatinine 1.18 06/28/2022 09:00 PM    BUN/Creatinine ratio 14 06/28/2022 09:00 PM    GFR est AA 52 (L) 06/28/2022 09:00 PM    GFR est non-AA 43 (L) 06/28/2022 09:00 PM    Calcium 10.1 06/28/2022 09:00 PM    Bilirubin, total 0.6 06/23/2022 03:21 PM    Alk.  phosphatase 82 06/23/2022 03:21 PM    Protein, total 7.4 06/23/2022 03:21 PM    Albumin 3.5 (L) 06/23/2022 03:21 PM    Globulin 4.6 (H) 11/07/2018 02:39 PM    A-G Ratio 0.9 (L) 06/23/2022 03:21 PM ALT (SGPT) 23 06/23/2022 03:21 PM    AST (SGOT) 37 06/23/2022 03:21 PM     Lab Results   Component Value Date/Time    Cholesterol, total 119 09/17/2013 01:20 AM    HDL Cholesterol <10 (L) 09/17/2013 01:20 AM    LDL, calculated NOT CALCULATED DUE TO LOW HDLC LEVEL 09/17/2013 01:20 AM    VLDL, calculated  09/17/2013 01:20 AM     Calculation not valid with this patient's other Lipid values. Triglyceride 174 09/17/2013 01:20 AM    CHOL/HDL Ratio 11.9 (H) 09/17/2013 01:20 AM       On this date 11/10/2022 I have spent >15 minutes for face to face telephone encounter with the patient and her family discussing working diagnoses and treatment plan. Additional time was spent reviewing previous notes, test results. Medical decision making complexity: moderate-high.     Roman Morillo MD   Family & Geriatric Medicine

## 2022-11-10 NOTE — PROGRESS NOTES
Would like to increase her Zoloft to 50 mg daily. Bell Marcum presents today for   Chief Complaint   Patient presents with    Follow Up Chronic Condition     3 month       Depression Screening:  3 most recent PHQ Screens 11/10/2022   Little interest or pleasure in doing things Not at all   Feeling down, depressed, irritable, or hopeless Not at all   Total Score PHQ 2 0       Learning Assessment:  No flowsheet data found. Fall Risk  Fall Risk Assessment, last 12 mths 11/10/2022   Able to walk? Yes   Fall in past 12 months? 0   Do you feel unsteady? 0   Are you worried about falling 0   Is TUG test greater than 12 seconds? -   Is the gait abnormal? -   Number of falls in past 12 months -   Fall with injury? -       ADL  ADL Assessment 11/10/2022   Feeding yourself Help Needed   Getting from bed to chair Help Needed   Getting dressed Help Needed   Bathing or showering Help Needed   Walk across the room (includes cane/walker) Help Needed   Using the telphone Help Needed   Taking your medications Help Needed   Preparing meals Help Needed   Managing money (expenses/bills) Help Needed   Moderately strenuous housework (laundry) Help Needed   Shopping for personal items (toiletries/medicines) Help Needed   Shopping for groceries Help Needed   Driving Help Needed   Climbing a flight of stairs Help Needed   Getting to places beyond walking distances Help Needed       Health Maintenance reviewed and discussed and ordered per Provider.     Health Maintenance Due   Topic Date Due    Foot Exam Q1  Never done    MICROALBUMIN Q1  Never done    Eye Exam Retinal or Dilated  Never done    DTaP/Tdap/Td series (1 - Tdap) Never done    Shingrix Vaccine Age 50> (1 of 2) Never done    Bone Densitometry (Dexa) Screening  Never done    Medicare Yearly Exam  Never done    Pneumococcal 65+ years (2 - PPSV23 if available, else PCV20) 10/30/2020    COVID-19 Vaccine (5 - Booster for Moderna series) 06/11/2022    Flu Vaccine (1) 08/01/2022   . Coordination of Care:  1. \"Have you been to the ER, urgent care clinic since your last visit? Hospitalized since your last visit? \" No    2. \"Have you seen or consulted any other health care providers outside of the [address] [place] [address] since your last visit? \" No     3. For patients aged 39-70: Has the patient had a colonoscopy? NA - based on age     If the patient is female:    4. For patients aged 41-77: Has the patient had a mammogram within the past 2 years? NA - based on age    11. For patients aged 21-65: Has the patient had a pap smear?  NA - based on age

## 2022-11-11 ENCOUNTER — HOME CARE VISIT (OUTPATIENT)
Dept: HOSPICE | Facility: HOSPICE | Age: 87
End: 2022-11-11
Payer: MEDICARE

## 2022-11-11 ENCOUNTER — HOME CARE VISIT (OUTPATIENT)
Dept: SCHEDULING | Facility: HOME HEALTH | Age: 87
End: 2022-11-11
Payer: MEDICARE

## 2022-11-11 VITALS
TEMPERATURE: 97.9 F | RESPIRATION RATE: 20 BRPM | SYSTOLIC BLOOD PRESSURE: 175 MMHG | DIASTOLIC BLOOD PRESSURE: 74 MMHG | HEART RATE: 86 BPM | OXYGEN SATURATION: 95 %

## 2022-11-11 PROCEDURE — G0155 HHCP-SVS OF CSW,EA 15 MIN: HCPCS

## 2022-11-11 PROCEDURE — G0299 HHS/HOSPICE OF RN EA 15 MIN: HCPCS

## 2022-11-11 PROCEDURE — G0156 HHCP-SVS OF AIDE,EA 15 MIN: HCPCS

## 2022-11-11 NOTE — HOSPICE
Medication refills ordered this visit: none     Medications reconciled and all medications are available in the home this visit. Medications are effective at this time. Supplies by type and quantity ordered this visit include: none     Consulted medical director/attending physician regarding: n/a    Instructed patient/family/caregiver on 24-hour hospice availability and phone number.     Plan for next visit:  none

## 2022-11-11 NOTE — HOSPICE
Reason for today's visit is to assess supportive needs, as well as need for FMLA for grandson. SW places call to grandson Amara Miller per his request. Amara Miller states he is doing well in overseeing pt's care and denies any needs for himself of any family members. Amara Miller reports that pt is \"doing better,\" as pt's anxiety has lessened and appetite has increased. Amara Miller states pt is sleeping well at this time. Amara Miller reports that as of now, he does not need FMLA paperwork but will call this SW should that need change. Amara Miller denies needing a face to face visit from this SW and knows to call SW should a need arise. Amara Miller agrees for this SW to continue to follow each month via phone to assess supportive needs and provide support verbalized.

## 2022-11-14 ENCOUNTER — HOME CARE VISIT (OUTPATIENT)
Dept: HOSPICE | Facility: HOSPICE | Age: 87
End: 2022-11-14
Payer: MEDICARE

## 2022-11-14 ENCOUNTER — HOME CARE VISIT (OUTPATIENT)
Dept: SCHEDULING | Facility: HOME HEALTH | Age: 87
End: 2022-11-14
Payer: MEDICARE

## 2022-11-14 NOTE — HOSPICE
The following standard precautions for infection control were utilized:  MAsk  Patient was in bed watching tv and sleeping.  and grandson were present and shared that she is sleeping more. Both are handling things pretty well. Will follow up.

## 2022-11-15 ENCOUNTER — HOME CARE VISIT (OUTPATIENT)
Dept: SCHEDULING | Facility: HOME HEALTH | Age: 87
End: 2022-11-15
Payer: MEDICARE

## 2022-11-15 PROCEDURE — G0156 HHCP-SVS OF AIDE,EA 15 MIN: HCPCS

## 2022-11-15 PROCEDURE — G0300 HHS/HOSPICE OF LPN EA 15 MIN: HCPCS

## 2022-11-16 VITALS
SYSTOLIC BLOOD PRESSURE: 135 MMHG | TEMPERATURE: 98 F | DIASTOLIC BLOOD PRESSURE: 86 MMHG | OXYGEN SATURATION: 96 % | HEART RATE: 79 BPM | RESPIRATION RATE: 18 BRPM

## 2022-11-16 NOTE — HOSPICE
Patient presents lying in bed. No signs of acute distress noted. Patient was resistant to this nurse taking vitals and argued with her  to get this nurse out of her house. Vitals within normal limits. NO new concerns or questions per grandson and . Medication refills: None needed this visit    Medications reconciled and all medications are available in the home this visit. The following education was provided regarding medications, medication interactions, and look a like medications: Medication side effects, dosages, purposes, frequencies. Response to teaching: Verbalized understanding. Medications  are effective at this time. Supplies by type and quantity ordered/delivered this visit include: None needed    Consulted attending physician regarding: Not needed this visit    Instructed patient and family on 24-hour hospice availability and phone number.

## 2022-11-18 ENCOUNTER — HOME CARE VISIT (OUTPATIENT)
Dept: SCHEDULING | Facility: HOME HEALTH | Age: 87
End: 2022-11-18
Payer: MEDICARE

## 2022-11-18 PROCEDURE — G0156 HHCP-SVS OF AIDE,EA 15 MIN: HCPCS

## 2022-11-22 ENCOUNTER — HOME CARE VISIT (OUTPATIENT)
Dept: SCHEDULING | Facility: HOME HEALTH | Age: 87
End: 2022-11-22
Payer: MEDICARE

## 2022-11-22 PROCEDURE — G0156 HHCP-SVS OF AIDE,EA 15 MIN: HCPCS

## 2022-11-22 PROCEDURE — G0300 HHS/HOSPICE OF LPN EA 15 MIN: HCPCS

## 2022-11-23 VITALS
DIASTOLIC BLOOD PRESSURE: 67 MMHG | TEMPERATURE: 98.4 F | OXYGEN SATURATION: 98 % | SYSTOLIC BLOOD PRESSURE: 139 MMHG | RESPIRATION RATE: 18 BRPM | HEART RATE: 66 BPM

## 2022-11-23 NOTE — HOSPICE
Patient presents in bed receiving her bath. No signs of acute distress noted. VItals within normal limits. No new concerns per caregiver. Medication refills: None needed    Medications reconciled and all medications are available in the home this visit. The following education was provided regarding medications, medication interactions, and look a like medications: Medication side effects, dosages, purposes, frequencies. Response to teaching: Verbalized understanding. Medications  are effective at this time. Supplies by type and quantity ordered/delivered this visit include: None needed this visit    Consulted attending physician regarding: Not needed at this time    Instructed patient and family on 24-hour hospice availability and phone number.

## 2022-11-25 ENCOUNTER — HOME CARE VISIT (OUTPATIENT)
Dept: SCHEDULING | Facility: HOME HEALTH | Age: 87
End: 2022-11-25
Payer: MEDICARE

## 2022-11-25 PROCEDURE — G0299 HHS/HOSPICE OF RN EA 15 MIN: HCPCS

## 2022-11-25 PROCEDURE — G0156 HHCP-SVS OF AIDE,EA 15 MIN: HCPCS

## 2022-11-27 VITALS — HEART RATE: 90 BPM | SYSTOLIC BLOOD PRESSURE: 124 MMHG | DIASTOLIC BLOOD PRESSURE: 74 MMHG | OXYGEN SATURATION: 98 %

## 2022-11-28 NOTE — HOSPICE
Patient awake in bed with glasses on. Patient behavior indicators negative for pain and discomfort. Writer asks patient several times how she is doing, patient just looks around, does not answer. Per  patient is sleeping pretty good. He states that she is sleeping through the night. LBM today per . States she had on after the bath lady left. Educated grandson on aspiration precations and to give patient liquids via teaspoon if she will not utilize a straw to prevent aspiration. Grandson verbalized understanding. Supplies:  XL gloves,    Meds: Haldol approx 18mL in bottle; (4) syringes with 1mL of Haldol, (1) syringe with 0.5mL of Haldol ad (4) empty syringes.    Morphine: 16mL Syringes    Med needs:  Dulcolax suppositories

## 2022-11-29 ENCOUNTER — HOME CARE VISIT (OUTPATIENT)
Dept: SCHEDULING | Facility: HOME HEALTH | Age: 87
End: 2022-11-29
Payer: MEDICARE

## 2022-11-29 VITALS
DIASTOLIC BLOOD PRESSURE: 87 MMHG | RESPIRATION RATE: 15 BRPM | HEART RATE: 66 BPM | OXYGEN SATURATION: 100 % | SYSTOLIC BLOOD PRESSURE: 122 MMHG

## 2022-11-29 PROCEDURE — G0299 HHS/HOSPICE OF RN EA 15 MIN: HCPCS

## 2022-11-29 PROCEDURE — G0156 HHCP-SVS OF AIDE,EA 15 MIN: HCPCS

## 2022-11-29 NOTE — HOSPICE
Patient resting in bed with HHA and  bathing the patient. Patient shakes head no to pain, behavior indicators negative as well. Per  patient is eating \"fine. \"  At the foot of the bed on tray is a 20oz cup with juice and another with water. Both have empty syringes next to them. Per Jose A Anderson patient had a small BM today.  denies any medication needs and supply needs. Writer inquired as to whether he has had to give the liquid Haldol since patient has been receiving Seroquel.  denies patient receiving Haldol or pain medicine. Patient patino catheter bag is purple in color and external catheter is a bluish color.

## 2022-12-02 ENCOUNTER — HOME CARE VISIT (OUTPATIENT)
Dept: HOSPICE | Facility: HOSPICE | Age: 87
End: 2022-12-02
Payer: MEDICARE

## 2022-12-02 ENCOUNTER — HOME CARE VISIT (OUTPATIENT)
Dept: SCHEDULING | Facility: HOME HEALTH | Age: 87
End: 2022-12-02
Payer: MEDICARE

## 2022-12-02 PROCEDURE — G0156 HHCP-SVS OF AIDE,EA 15 MIN: HCPCS

## 2022-12-02 PROCEDURE — G0299 HHS/HOSPICE OF RN EA 15 MIN: HCPCS

## 2022-12-02 NOTE — HOSPICE
The following standard precautions for infection control were utilized:  Mask  Patient was in bed and had just finished having her bath by the bath aid. Patients  and daughter were present at the bedside.  was accompanied by Reshma today for her orientation. Family and patient are doing weil,  will continue to follow up with patient and family.

## 2022-12-06 ENCOUNTER — HOME CARE VISIT (OUTPATIENT)
Dept: SCHEDULING | Facility: HOME HEALTH | Age: 87
End: 2022-12-06
Payer: MEDICARE

## 2022-12-06 VITALS
OXYGEN SATURATION: 98 % | RESPIRATION RATE: 15 BRPM | SYSTOLIC BLOOD PRESSURE: 127 MMHG | HEART RATE: 94 BPM | DIASTOLIC BLOOD PRESSURE: 82 MMHG | TEMPERATURE: 97.8 F

## 2022-12-06 PROCEDURE — G0299 HHS/HOSPICE OF RN EA 15 MIN: HCPCS

## 2022-12-06 PROCEDURE — G0156 HHCP-SVS OF AIDE,EA 15 MIN: HCPCS

## 2022-12-07 NOTE — HOSPICE
Patient awake and alert, up in bed watching television. Patient mumbles somthing that was incomprehensible when writer said hello to patient.  states patient is doing good when asked how she is. He also states the same when this nurse asks about patient bath earlier. Patient states \"no\" when asked if she is in pain. Patient behavior indicators negtive as well.  denies patient receiving any Morphine. He states that he gave the patient \"the mouth medicine\" at 1500 as his grandson told him to. Per  patient is sleep good, patient sleeps through the night. Still naps throughout the day. Per  patient eats breakfast; snack about noon and then eats dinner. Stopped sucking through straw. Using syringes per  so we know she is getting what she is supposed to have. Attempted to educate, spouse not responsive to education.         SUpplies needed: new positioning wedge, new booties, chux, 3XL diaper, wipes, antifungal powder and cream and zinc cream.  Ordered through [employer] for delivery, confirmation number: [id number]

## 2022-12-09 ENCOUNTER — HOME CARE VISIT (OUTPATIENT)
Dept: SCHEDULING | Facility: HOME HEALTH | Age: 87
End: 2022-12-09
Payer: MEDICARE

## 2022-12-09 PROCEDURE — G0300 HHS/HOSPICE OF LPN EA 15 MIN: HCPCS

## 2022-12-09 PROCEDURE — G0156 HHCP-SVS OF AIDE,EA 15 MIN: HCPCS

## 2022-12-09 PROCEDURE — G0155 HHCP-SVS OF CSW,EA 15 MIN: HCPCS

## 2022-12-09 NOTE — HOSPICE
Reason for today's visit is to assess supportive and FMLA needs.  places call to grandson Audelia Negron per his request, as pt is not able to accurately report. Audelia Negron states he is doing well and denies any supportive needs for himself, pt/family. Audelia Allenangelist reports that pt continues to have a good appetite, eating 100% of soft foods. Audelia  states pt is not having issues with pain at this time, and pt remains bedbound. Audelia Negron states he will be changing jobs at the beginning of next year and at that time, he may need FMLA paperwork.  educates Audelia Negron on [employer] and to call this SW when this is needed, Cole verbalizes understanding.

## 2022-12-10 VITALS
DIASTOLIC BLOOD PRESSURE: 71 MMHG | HEART RATE: 63 BPM | TEMPERATURE: 98.8 F | OXYGEN SATURATION: 96 % | SYSTOLIC BLOOD PRESSURE: 139 MMHG | RESPIRATION RATE: 18 BRPM

## 2022-12-12 NOTE — HOSPICE
Patient presents in bed watching TV. No signs of discomfort at this time. Vitals within normal limits. No new concerns or questions per grandson and                  Medication refills: None needed    Medications reconciled and all medications are available in the home this visit. The following education was provided regarding medications, medication interactions, and look a like medications: Medication side effects, dosages, purposes, frequencies. Response to teaching: Verbalized understanding. . Medications  are effective at this time. Supplies by type and quantity ordered/delivered this visit include: None needed    Consulted attending physician regarding: Not needed this visit    Instructed patient and family on 24-hour hospice availability and phone number.

## 2022-12-13 ENCOUNTER — HOME CARE VISIT (OUTPATIENT)
Dept: SCHEDULING | Facility: HOME HEALTH | Age: 87
End: 2022-12-13
Payer: MEDICARE

## 2022-12-13 VITALS
RESPIRATION RATE: 18 BRPM | HEART RATE: 63 BPM | OXYGEN SATURATION: 99 % | TEMPERATURE: 98.9 F | SYSTOLIC BLOOD PRESSURE: 125 MMHG | DIASTOLIC BLOOD PRESSURE: 60 MMHG

## 2022-12-13 PROCEDURE — G0156 HHCP-SVS OF AIDE,EA 15 MIN: HCPCS

## 2022-12-13 PROCEDURE — G0300 HHS/HOSPICE OF LPN EA 15 MIN: HCPCS

## 2022-12-14 NOTE — HOSPICE
Patient presents in bed in semi-fowlers position. No signs of distress or discomfort. Patient was pleasant allowed this nurse to assess. Vitals within normal limits. No complaints of pain. sacral wound examined and zinc applied with foam dressing at the request of the patients grandson. No new concerns at this time. Pain:none    Medication refills: Bisacodyl suppositories, Tylenol suppositories, Haldol    Medications reconciled and all medications are available in the home this visit. The following education was provided regarding medications, medication interactions, and look a like medications:. Response to teaching: Verbalized understanding. Medications  are effective at this time. Supplies by type and quantity ordered/delivered this visit include: Large sacral optifoam    Consulted attending physician regarding: Refills for Tylenol Suppositories    Instructed patient and family on 24-hour hospice availability and phone number. Plan for next visit:  Continued end of life education and support.

## 2022-12-16 ENCOUNTER — HOME CARE VISIT (OUTPATIENT)
Dept: SCHEDULING | Facility: HOME HEALTH | Age: 87
End: 2022-12-16
Payer: MEDICARE

## 2022-12-16 PROCEDURE — G0156 HHCP-SVS OF AIDE,EA 15 MIN: HCPCS

## 2022-12-16 PROCEDURE — G0299 HHS/HOSPICE OF RN EA 15 MIN: HCPCS

## 2022-12-18 VITALS
RESPIRATION RATE: 17 BRPM | SYSTOLIC BLOOD PRESSURE: 156 MMHG | DIASTOLIC BLOOD PRESSURE: 79 MMHG | OXYGEN SATURATION: 99 % | TEMPERATURE: 98 F | HEART RATE: 63 BPM

## 2022-12-19 NOTE — HOSPICE
Patient resting in bed in no apparent distress. Patient states, \"Hey\"when writer says hello. WHen asked how she is doing, patient states, \"Fine\". When asked if she is in pain patient states, \"No.\"  patient behavior indicators negative as well.  states that he had to give patient 1mL of Haldol at 1300 and then again at 1340 because the patient was acting out during her bath. Per patient  she is doing alright with eating.  denies patient having any pain. He states that she is still sleeping well. Patient starts to speak incomprehensibly and appears to be \"crying\". She makes the face but no tears come out of her eyes. Patient will alternate between \"crying\" and lying down with her eyes closed. Patient appears agitated and moves right hand what she can consistently. Spoke to daughter on speaker phone. She asked if the patino would be replaced. Writer informed her that the catheter would be replaced early next week when more staff is available to assist.  Informed  that there is a patino in the home already.  denies any medication or supply needs. Grandson dulcolax; wipes    Writer spoke with thais about visit, informing him of the patient behaviors of whining like she is crying and then acting like she was going to sleep. Cally inquired if patient was still taking her Zoloft. Oval Dub states that she is and that she has been on 50mg since beginning of November. Writer informed him writer was going to reach out to Hospice NP regarding patient behaviors to see if there is anything we can do to assist in getting her more comfortable.

## 2022-12-20 ENCOUNTER — HOME CARE VISIT (OUTPATIENT)
Dept: SCHEDULING | Facility: HOME HEALTH | Age: 87
End: 2022-12-20
Payer: MEDICARE

## 2022-12-20 PROCEDURE — G0156 HHCP-SVS OF AIDE,EA 15 MIN: HCPCS

## 2022-12-21 ENCOUNTER — HOME CARE VISIT (OUTPATIENT)
Dept: SCHEDULING | Facility: HOME HEALTH | Age: 87
End: 2022-12-21
Payer: MEDICARE

## 2022-12-21 PROCEDURE — G0299 HHS/HOSPICE OF RN EA 15 MIN: HCPCS

## 2022-12-22 ENCOUNTER — HOME CARE VISIT (OUTPATIENT)
Dept: SCHEDULING | Facility: HOME HEALTH | Age: 87
End: 2022-12-22
Payer: MEDICARE

## 2022-12-22 VITALS
RESPIRATION RATE: 17 BRPM | TEMPERATURE: 98.4 F | OXYGEN SATURATION: 94 % | HEART RATE: 88 BPM | DIASTOLIC BLOOD PRESSURE: 80 MMHG | SYSTOLIC BLOOD PRESSURE: 127 MMHG

## 2022-12-22 PROCEDURE — G0156 HHCP-SVS OF AIDE,EA 15 MIN: HCPCS

## 2022-12-23 ENCOUNTER — HOME CARE VISIT (OUTPATIENT)
Dept: SCHEDULING | Facility: HOME HEALTH | Age: 87
End: 2022-12-23
Payer: MEDICARE

## 2022-12-23 PROCEDURE — G0299 HHS/HOSPICE OF RN EA 15 MIN: HCPCS

## 2022-12-25 VITALS
OXYGEN SATURATION: 97 % | HEART RATE: 84 BPM | DIASTOLIC BLOOD PRESSURE: 72 MMHG | TEMPERATURE: 98.3 F | RESPIRATION RATE: 15 BRPM | SYSTOLIC BLOOD PRESSURE: 129 MMHG

## 2022-12-26 NOTE — HOSPICE
Her urine is clearing up. I am giving her two of those pumps filled with water on the hour. SHe is eating about the same per the spouse. He states that patient is still eating well. Face appears thinner    He plays in the game.                   divine confirmation # [id number]

## 2022-12-27 ENCOUNTER — HOME CARE VISIT (OUTPATIENT)
Dept: SCHEDULING | Facility: HOME HEALTH | Age: 87
End: 2022-12-27
Payer: MEDICARE

## 2022-12-27 PROCEDURE — G0156 HHCP-SVS OF AIDE,EA 15 MIN: HCPCS

## 2022-12-28 ENCOUNTER — HOME CARE VISIT (OUTPATIENT)
Dept: SCHEDULING | Facility: HOME HEALTH | Age: 87
End: 2022-12-28
Payer: MEDICARE

## 2022-12-28 VITALS
RESPIRATION RATE: 18 BRPM | HEART RATE: 78 BPM | TEMPERATURE: 98 F | SYSTOLIC BLOOD PRESSURE: 128 MMHG | OXYGEN SATURATION: 95 % | DIASTOLIC BLOOD PRESSURE: 72 MMHG

## 2022-12-28 PROCEDURE — G0300 HHS/HOSPICE OF LPN EA 15 MIN: HCPCS

## 2022-12-29 NOTE — HOSPICE
Patient presents in bed,  had just completed matt care. New skin tear to left buttock per grandson. Area cleansed and foam applied for protection. Pain:    Medication refills: None needed this visit    Medications reconciled and all medications are available in the home this visit. The following education was provided regarding medications, medication interactions, and look a like medications: Medication side effects, dosages, purposes, frequencies. Response to teaching: Verbalized understanding. Medications  are effective at this time. Supplies by type and quantity ordered/delivered this visit include: None needed    Consulted attending physician regarding: Not needed this visit    Instructed patient and family on 24-hour hospice availability and phone number.

## 2022-12-30 ENCOUNTER — HOME CARE VISIT (OUTPATIENT)
Dept: SCHEDULING | Facility: HOME HEALTH | Age: 87
End: 2022-12-30
Payer: MEDICARE

## 2022-12-30 PROCEDURE — G0156 HHCP-SVS OF AIDE,EA 15 MIN: HCPCS

## 2022-12-31 ENCOUNTER — HOME CARE VISIT (OUTPATIENT)
Dept: SCHEDULING | Facility: HOME HEALTH | Age: 87
End: 2022-12-31
Payer: MEDICARE

## 2022-12-31 PROCEDURE — G0300 HHS/HOSPICE OF LPN EA 15 MIN: HCPCS

## 2023-01-01 ENCOUNTER — HOME CARE VISIT (OUTPATIENT)
Age: 88
End: 2023-01-01
Payer: MEDICARE

## 2023-01-01 VITALS
TEMPERATURE: 98 F | OXYGEN SATURATION: 99 % | DIASTOLIC BLOOD PRESSURE: 76 MMHG | RESPIRATION RATE: 24 BRPM | SYSTOLIC BLOOD PRESSURE: 136 MMHG | HEART RATE: 102 BPM

## 2023-01-01 VITALS
DIASTOLIC BLOOD PRESSURE: 70 MMHG | TEMPERATURE: 97.8 F | OXYGEN SATURATION: 95 % | SYSTOLIC BLOOD PRESSURE: 160 MMHG | RESPIRATION RATE: 22 BRPM | HEART RATE: 106 BPM

## 2023-01-01 PROCEDURE — G0299 HHS/HOSPICE OF RN EA 15 MIN: HCPCS

## 2023-01-01 PROCEDURE — G0155 HHCP-SVS OF CSW,EA 15 MIN: HCPCS

## 2023-01-01 PROCEDURE — G0156 HHCP-SVS OF AIDE,EA 15 MIN: HCPCS

## 2023-01-01 ASSESSMENT — ENCOUNTER SYMPTOMS
DYSPNEA ACTIVITY LEVEL: AT REST
BOWEL INCONTINENCE: 1
HEMOPTYSIS: 0
BOWEL INCONTINENCE: 1

## 2023-01-03 ENCOUNTER — HOME CARE VISIT (OUTPATIENT)
Dept: SCHEDULING | Facility: HOME HEALTH | Age: 88
End: 2023-01-03
Payer: MEDICARE

## 2023-01-03 VITALS
SYSTOLIC BLOOD PRESSURE: 98 MMHG | TEMPERATURE: 97.6 F | DIASTOLIC BLOOD PRESSURE: 53 MMHG | HEART RATE: 74 BPM | OXYGEN SATURATION: 98 %

## 2023-01-03 VITALS
HEART RATE: 79 BPM | TEMPERATURE: 98.4 F | OXYGEN SATURATION: 95 % | DIASTOLIC BLOOD PRESSURE: 67 MMHG | SYSTOLIC BLOOD PRESSURE: 128 MMHG | RESPIRATION RATE: 18 BRPM

## 2023-01-03 PROCEDURE — G0299 HHS/HOSPICE OF RN EA 15 MIN: HCPCS

## 2023-01-03 PROCEDURE — G0156 HHCP-SVS OF AIDE,EA 15 MIN: HCPCS

## 2023-01-03 NOTE — HOSPICE
Patient presents in bed. No signs of acute distress noted. Vitals within normal limits. No new concerns per family at this time. Medication refills: None needed    Medications reconciled and all medications are available in the home this visit. The following education was provided regarding medications, medication interactions, and look a like medications: Medication side effects, dosages, purposes, frequencies. Response to teaching: Verbalized understanding. Medications  are effective at this time. Supplies by type and quantity ordered/delivered this visit include: No supplies needed    Consulted attending physician regarding: Not needed this visit    Instructed patient and family on 24-hour hospice availability and phone number.

## 2023-01-04 NOTE — HOSPICE
Patient resting in bed comfortably on room air in no apparent distress. Spouse states he gave her the \"medicaion\" earlier. Patient behavior indicators negative for pain and discomfort. Patient spouse denies the patient having any issues with pain    Assisted bath aide with bathing. Updated photos of wounds placed in chart. Writer educated patient spouse that foam dressing on patient sacral area as not stick to area due to all of the zinc cream and that it was holding moisture against patient sweaty skin and could cause further skin issues. Writer explained to spouse that Madyson Bloch was not going to replace the dressing and suggested that patient be turned fully onto her side to allow her time to stay off her sacral area and air to get to wound. Patient spouse states that he tries. Writer informed him that this nurse would order him anohter positional wedge as he stated the other one was thrown away because it got stool on it. 5 - 1mL syringes of Haldol; about 5mL of Haldol in bottle; approximately 15mL in bottle; 5 suppositories each of Tylenol and Dulcolax      Supplies needed: fungal powder, positional wedge.   Medline Order Number : [id number]

## 2023-01-06 ENCOUNTER — HOME CARE VISIT (OUTPATIENT)
Dept: HOSPICE | Facility: HOSPICE | Age: 88
End: 2023-01-06
Payer: MEDICARE

## 2023-01-06 ENCOUNTER — HOME CARE VISIT (OUTPATIENT)
Dept: SCHEDULING | Facility: HOME HEALTH | Age: 88
End: 2023-01-06
Payer: MEDICARE

## 2023-01-06 VITALS
RESPIRATION RATE: 16 BRPM | DIASTOLIC BLOOD PRESSURE: 72 MMHG | TEMPERATURE: 97.7 F | OXYGEN SATURATION: 98 % | SYSTOLIC BLOOD PRESSURE: 110 MMHG

## 2023-01-06 PROCEDURE — G0299 HHS/HOSPICE OF RN EA 15 MIN: HCPCS

## 2023-01-06 PROCEDURE — G0155 HHCP-SVS OF CSW,EA 15 MIN: HCPCS

## 2023-01-06 PROCEDURE — G0156 HHCP-SVS OF AIDE,EA 15 MIN: HCPCS

## 2023-01-06 NOTE — HOSPICE
Reason for today's visit is to assess emotional supportive needs and FMLA for thais. SW places call to grandtalita per his request. Sharri King reports that pt had some increased anxiety over the holidays due to loss of independance, as pt use to do all of the cooking and can no longer. Thais states Mayela Amos is better now though. \" Sharri King reports that pts appetite remains good and anthritic pain is managed with topicals. BROOKLYNN reiterates that SW is available to provide support to pt and thais verbalizes understanding, Sharri King states he will know more about his FMLA paperwork next month once he is oriented in his new job, but will call this SW prior to if the need for FMLA arises prior to next visit. Sharri King states he is coping well at this time and denies any needs. Thais agrees for SW to contiue to follow each month to assess supportive needs and provide support verbalized.

## 2023-01-06 NOTE — HOSPICE
Patient awake and alert, laying in bed on room air in no apparent distress. Patient denies pain and discomfort. Patient behavior indicators negative for pain and discomfort.  denies patient having any pain,    Patient asked how she is doing. Patient respond \"I'm fine. \"  Patient demeanor is pleasant and patient smiles during visit. Per spouse, patient is sleeping good. She slept through the night last night, but the night before she did not sleep at all. Per spouse, patient is \"eathing pretty good. She still has a good appetite. \"           denies any issues, questions or concerns. Per writer's conversation with patient grandson, medication refills needed: Lorazepam and Haldol. Medications refilled through [employer] for home delivery. Confirmation number: [id number]. Grandson also informed this nurse that patient supplies have been received.

## 2023-01-10 ENCOUNTER — HOME CARE VISIT (OUTPATIENT)
Dept: SCHEDULING | Facility: HOME HEALTH | Age: 88
End: 2023-01-10
Payer: MEDICARE

## 2023-01-10 VITALS
DIASTOLIC BLOOD PRESSURE: 79 MMHG | SYSTOLIC BLOOD PRESSURE: 145 MMHG | OXYGEN SATURATION: 98 % | HEART RATE: 85 BPM | TEMPERATURE: 98.3 F | RESPIRATION RATE: 16 BRPM

## 2023-01-10 PROCEDURE — G0299 HHS/HOSPICE OF RN EA 15 MIN: HCPCS

## 2023-01-10 PROCEDURE — G0156 HHCP-SVS OF AIDE,EA 15 MIN: HCPCS

## 2023-01-10 NOTE — HOSPICE
Patient laying in bed awake in no apparent distress. When asked, patient states \"no\" to pain. Patient behavior indicators negative as well. Patient very talkative today, however, this nurse could not understand all of what patient was saying. \"I will still pray for him\". Okay when telling her about vital signs. When told about pressure for BP, she states, \"I know. \"     Utilizing her left hand today picking at the coveres. Patient has bilateral heel boots and left leg elevated on Pillow. Sleeping pretty fair. Some nights better than others. Per  and grandson, patient is calling out to family members who have passed on. Per  patient is eating pretty good, that she has a good appetite.             Medication needs: Liquid lorazepam.  [facility] [id number]

## 2023-01-13 ENCOUNTER — HOME CARE VISIT (OUTPATIENT)
Dept: SCHEDULING | Facility: HOME HEALTH | Age: 88
End: 2023-01-13
Payer: MEDICARE

## 2023-01-13 PROCEDURE — G0300 HHS/HOSPICE OF LPN EA 15 MIN: HCPCS

## 2023-01-13 PROCEDURE — G0156 HHCP-SVS OF AIDE,EA 15 MIN: HCPCS

## 2023-01-13 NOTE — HOSPICE
The following standard precautions for infection control were utilized:  Mask  Patient was in bed with  by her side. She was all smiles today and in great spirits.

## 2023-01-15 VITALS
SYSTOLIC BLOOD PRESSURE: 150 MMHG | RESPIRATION RATE: 18 BRPM | TEMPERATURE: 98.4 F | DIASTOLIC BLOOD PRESSURE: 78 MMHG | HEART RATE: 81 BPM | OXYGEN SATURATION: 98 %

## 2023-01-15 NOTE — HOSPICE
Patient presents sitting in bed watching TV. Alert and pleasant today. No new concerns per patients . Pain:No signs of pain at this time    Medication refills: Senna, Dulcolax Confirmation #  [id number]    Medications reconciled and all medications are available in the home this visit. The following education was provided regarding medications, medication interactions, and look a like medications: Medication side effects, dosages, purposes, frequencies. Response to teaching: Verbalized understanding. Medications  are effective at this time. Supplies by type and quantity ordered/delivered this visit include: Wipes left in the home    Consulted attending physician regarding: Not needed his visit    Instructed patient and family on 24-hour hospice availability and phone number.

## 2023-01-16 ENCOUNTER — HOSPICE ADMISSION (OUTPATIENT)
Age: 88
End: 2023-01-16
Payer: MEDICARE

## 2023-01-17 ENCOUNTER — HOME CARE VISIT (OUTPATIENT)
Dept: SCHEDULING | Facility: HOME HEALTH | Age: 88
End: 2023-01-17
Payer: MEDICARE

## 2023-01-17 PROCEDURE — G0156 HHCP-SVS OF AIDE,EA 15 MIN: HCPCS

## 2023-01-18 ENCOUNTER — HOME CARE VISIT (OUTPATIENT)
Dept: SCHEDULING | Facility: HOME HEALTH | Age: 88
End: 2023-01-18
Payer: MEDICARE

## 2023-01-18 ENCOUNTER — HOME CARE VISIT (OUTPATIENT)
Dept: HOSPICE | Facility: HOSPICE | Age: 88
End: 2023-01-18
Payer: MEDICARE

## 2023-01-18 VITALS
DIASTOLIC BLOOD PRESSURE: 68 MMHG | OXYGEN SATURATION: 98 % | RESPIRATION RATE: 18 BRPM | HEART RATE: 71 BPM | SYSTOLIC BLOOD PRESSURE: 134 MMHG | TEMPERATURE: 98.9 F

## 2023-01-18 PROCEDURE — G0300 HHS/HOSPICE OF LPN EA 15 MIN: HCPCS

## 2023-01-18 NOTE — HOSPICE
Patient presents lying in bed in semi-fowlers position. No signs of distress or discomfort. Vitals within normal limits. 18Fr catheter placed with 10mL balloon. No new concerns per caregiver                 Medication refills: None needed    Medications reconciled and all medications are available in the home this visit. The following education was provided regarding medications, medication interactions, and look a like medications: Medication side effects, dosages, purposes, frequencies. Response to teaching: Verbalized understanding. Medications  are effective at this time. Supplies by type and quantity ordered/delivered this visit include: 3XL briefs, wipes    Consulted attending physician regarding: Not needed. Instructed patient and family on 24-hour hospice availability and phone number.

## 2023-01-20 ENCOUNTER — HOME CARE VISIT (OUTPATIENT)
Dept: HOSPICE | Facility: HOSPICE | Age: 88
End: 2023-01-20
Payer: MEDICARE

## 2023-01-20 ENCOUNTER — HOME CARE VISIT (OUTPATIENT)
Dept: SCHEDULING | Facility: HOME HEALTH | Age: 88
End: 2023-01-20
Payer: MEDICARE

## 2023-01-20 VITALS
DIASTOLIC BLOOD PRESSURE: 67 MMHG | TEMPERATURE: 98.8 F | OXYGEN SATURATION: 96 % | SYSTOLIC BLOOD PRESSURE: 120 MMHG | HEART RATE: 82 BPM

## 2023-01-20 PROCEDURE — G0156 HHCP-SVS OF AIDE,EA 15 MIN: HCPCS

## 2023-01-20 PROCEDURE — G0299 HHS/HOSPICE OF RN EA 15 MIN: HCPCS

## 2023-01-20 NOTE — HOSPICE
Was given Haldol at 1200 in preparation for this nurse and the bath aide. Per spouse patient is eating pretty good. Patient patino draining cedrick clear urine with sediment. Patient behavior inidcators negative for pain and discomfort. Per spouse she has not had any issues with pain. When asked, spouse states that patient is sleeping good. During assessment, writer asks patient to take a deep breath and she states, \"No\"      Tell HHA, I am talking to my duaghter. When HHA asks her what do you want to tell her. Patient states, \"Mind your business. \"  Patient then calls her . Tell HHA that she is bothering her. HHA tells writer that patient was nice and having a conversation with her the other day. Today patient consistently calls out for . HHA tells her Monique Logan is here, she says, \"I know\"  She fights HHGENEVIEVE while she tries to clean out the inside of her contracted hands. Patient starts to cry. Patient asks Monique Logan for a \"switch\". What's wrong with him? Patient calls  and tells him to come over here. Patient tells HHA don't put that stuff on my had (lotion). HHA tells her it is good for her skin. Patient states, \"I don't care. \"     Patient states, He must be gone. HHA lets her know that  is still there in the chair and he will protect her. Patient consistently states, \"Marvin\" and looks as if she is crying. Patient sttes that she is ready to go home to her mama's house and tellsHHA that she is talking to her granddaddy. She tells HHA that she says yeah and we say no and it terrible. While rolling patient, patient states, \"No grandma stop it. Get out of here grandma. \"        Supplies needed: XL gloves, syringes, antifungal powder and ointment.   Order Number : [id number]

## 2023-01-24 ENCOUNTER — HOME CARE VISIT (OUTPATIENT)
Dept: SCHEDULING | Facility: HOME HEALTH | Age: 88
End: 2023-01-24
Payer: MEDICARE

## 2023-01-24 VITALS
SYSTOLIC BLOOD PRESSURE: 129 MMHG | HEART RATE: 94 BPM | OXYGEN SATURATION: 100 % | DIASTOLIC BLOOD PRESSURE: 66 MMHG | TEMPERATURE: 98.9 F

## 2023-01-24 PROCEDURE — G0156 HHCP-SVS OF AIDE,EA 15 MIN: HCPCS

## 2023-01-24 PROCEDURE — G0299 HHS/HOSPICE OF RN EA 15 MIN: HCPCS

## 2023-01-24 RX ADMIN — HALOPERIDOL 1 MG: 2 SOLUTION ORAL at 13:47

## 2023-01-24 NOTE — HOSPICE
Patient awake and alert, fussing and talking. Most of what she is stating, this nurse cannot understand. [facility]    You didnt tell them I didn't want it. When asked wht, she said I don't want my hair washed and to get washed. Mary Ann Ramirez,      He didn't pay me no mind. 64 years    I am going home. When asked where home is, she says [employer] going to have a  on the side of your door]    I will call my mama in a heartbeat. Starts whining when spouse tells her that her grandson is at work. When  asks why she is whining, she states that it is time to go home. He says no. She says, yes it is i am tired.  have mercy. I dont know where my daddy is. [facility] have mercy I don't see my daddy. I can't find my sister or my daddy. I thought i saw my daddy.  states, you might have but he is a picture, he ain't human no more. What iare you doing, that's a no-no  given by  to \"quiet her nerves\"    You better come on I am ready to go home. , you aint ready for that    Writer spoke via telephone to patient thais regarding patient taking Keflex daily and confirmed what order was for. Thais states that PCP put patient on that as a prophylaxis for UTI as she has 3-4 in a very short time span. Writer informed him that the current order says for skin infection. He states that since the patient has the patino catheter, he will discuss with patient's PCP when she comes back from maternity leave whether patient still needs to be on medication. Writer also asked thais about stopping the 81mg aspirin and Provochol as we will not be testing her blood to check cholesterol and the bleed risk his grandmother is while taking aspirin. Deborah Sparks states that the will discuss it with the patient's  and will get maría to this nurse.       Writer also discussed with thais that this nurse will be placing patient on weekly visits as she is stable. Grandson verbalized understanding. Writer informed him that he or patient spouse could call the 24-hour number at any time and a nurse would come out and that her visit could be increased again based on her condition. Meds needed: Tylenol  Suppositories.   Confirmation number: [id number]

## 2023-01-25 ENCOUNTER — HOME CARE VISIT (OUTPATIENT)
Dept: HOSPICE | Facility: HOSPICE | Age: 88
End: 2023-01-25
Payer: MEDICARE

## 2023-01-27 ENCOUNTER — HOME CARE VISIT (OUTPATIENT)
Dept: HOSPICE | Facility: HOSPICE | Age: 88
End: 2023-01-27
Payer: MEDICARE

## 2023-01-30 DIAGNOSIS — M85.88 OTHER SPECIFIED DISORDERS OF BONE DENSITY AND STRUCTURE, OTHER SITE: Primary | ICD-10-CM

## 2023-01-31 ENCOUNTER — HOME CARE VISIT (OUTPATIENT)
Dept: SCHEDULING | Facility: HOME HEALTH | Age: 88
End: 2023-01-31
Payer: MEDICARE

## 2023-01-31 PROCEDURE — G0156 HHCP-SVS OF AIDE,EA 15 MIN: HCPCS

## 2023-02-01 ENCOUNTER — HOME CARE VISIT (OUTPATIENT)
Age: 88
End: 2023-02-01
Payer: MEDICARE

## 2023-02-01 VITALS
TEMPERATURE: 98.4 F | HEART RATE: 84 BPM | SYSTOLIC BLOOD PRESSURE: 125 MMHG | DIASTOLIC BLOOD PRESSURE: 72 MMHG | RESPIRATION RATE: 16 BRPM | OXYGEN SATURATION: 100 %

## 2023-02-01 PROCEDURE — G0299 HHS/HOSPICE OF RN EA 15 MIN: HCPCS

## 2023-02-01 ASSESSMENT — ENCOUNTER SYMPTOMS: TROUBLE SWALLOWING: 1

## 2023-02-01 NOTE — HOSPICE
Patient resting in bed, awake and mumbling. Patient answers when her name is called. Patient denies pain and discomfort by stating, \"No thank God, thank God, thank God\" when asked if she had pain. Patient behavior indicators are negative for pain and discomfort as well. Spouse denies giving patient any pain medication. Patient consistently mumbles throughout visit. At times, things are understood. Patient was heard counting by 5's from , talking about money, told this nurse to have another baby, something about [employer] and before exiting at the end of the visit, patient told this nurse \"You don't want none of this. \"   Patient spouse informs this nurse that patient has been mumbing more and that she slept all day yesterday. He laughs stating, Zohra Carlos has all the energy to stay up and talk today after all that sleep. \"      Per conversation grandson, he reports that \"the eschar fell off her heel. \"  He also states that family needs zinc cream for her heel and he needs a refill of Haldol. Writer inquired of grandson of how the patient was eating. Writer explained that the grandfather consistently tells this nurse that she is eating good but writer cannot quantify how the patient is eating by hearing \"good\". Writer continued  that his good and his grandfather's good may be different than mine  [place] agreed. He informed this nurse that the patient usually eats oatmeal or grits for breakfast.  He states that she will drink 1-2 8oz bottles of Ensure daily, will eat 2 cups of vanilla ice cream a day and will eat some ground meat with pureed vegetables. He states that sometimes the patient will have soup. Writer asked the grandson if he could help this nurse start quantifying how much the patient is eating. Writer informed him that it does not need to be exact, but writer would appreciate something better than good. Grandson verbalized understanding.       Haldol refilled through [facility] Recommendations from today's MTM visit:                                                      1. Increase topiramate to 50 mg twice daily    2. Make sure to drink 6-8 glasses (8 ounce each) of water to ensure adequate hydration and reduce side effects of topiramate     Follow-up: 2/9/22 at 11 am    It was great to speak with you today.  I value your experience and would be very thankful for your time with providing feedback on our clinic survey. You may receive a survey via email or text message in the next few days.     To schedule another MTM appointment, please call the clinic directly or you may call the MTM scheduling line at [phone] or toll-free at [phone].     My Clinical Pharmacist's contact information:                                                      Please feel free to contact me with any questions or concerns you have.      Joaquina Alvarez, Pharm.D.  Medication Therapy Management Pharmacist  [facility]     delivery.   Confirmation number: [id number]

## 2023-02-02 ENCOUNTER — HOME CARE VISIT (OUTPATIENT)
Age: 88
End: 2023-02-02
Payer: MEDICARE

## 2023-02-02 PROCEDURE — G0155 HHCP-SVS OF CSW,EA 15 MIN: HCPCS

## 2023-02-02 NOTE — HOSPICE
Reason for today's visit is to assess emotional support needs and FMLA for santhosh Nance. SW places call to [facility] to complete visit, per his request. Jose Hernandez states all involved are doing well and have no emotional distress at this time. GURINDER RODRIGUEZ states  pt is not complaining of pain and they are still using topicals for discomfort when needed. GURINDER RODRIGUEZ reports that pt is not sleeping as well as she was and pt is mumbling more now indecipherably. Goyo reports that pt is having 1-2 ensure daily along with two bowels of ice cream and grits. GURINDER RODRIGUEZ reports that he has changed jobs which allows him to work from home more often. GURINDER RODRIGUEZ states he does not need FMLA at this time but he may in the near future, and will call this SW for assistance. SW provides active listening and support. GURINDER RODRIGUEZ agrees for this SW to continue to follow once per month.

## 2023-02-03 ENCOUNTER — HOME CARE VISIT (OUTPATIENT)
Age: 88
End: 2023-02-03
Payer: MEDICARE

## 2023-02-03 DIAGNOSIS — M85.88 OTHER SPECIFIED DISORDERS OF BONE DENSITY AND STRUCTURE, OTHER SITE: Primary | ICD-10-CM

## 2023-02-03 PROCEDURE — G0156 HHCP-SVS OF AIDE,EA 15 MIN: HCPCS

## 2023-02-07 ENCOUNTER — HOME CARE VISIT (OUTPATIENT)
Age: 88
End: 2023-02-07
Payer: MEDICARE

## 2023-02-07 PROCEDURE — G0156 HHCP-SVS OF AIDE,EA 15 MIN: HCPCS

## 2023-02-08 ENCOUNTER — HOME CARE VISIT (OUTPATIENT)
Age: 88
End: 2023-02-08
Payer: MEDICARE

## 2023-02-08 PROCEDURE — G0300 HHS/HOSPICE OF LPN EA 15 MIN: HCPCS

## 2023-02-09 VITALS
HEART RATE: 70 BPM | OXYGEN SATURATION: 99 % | RESPIRATION RATE: 18 BRPM | DIASTOLIC BLOOD PRESSURE: 61 MMHG | TEMPERATURE: 98.6 F | SYSTOLIC BLOOD PRESSURE: 127 MMHG

## 2023-02-09 NOTE — HOSPICE
Medication refills ordered this visit: No refills needed    Medications reconciled and all medications are available in the home this visit. The following education was provided regarding medications, medication interactions, and look alike medications: Medication side effects, dosages, purposes, frequencies. Response to teaching: Verbalized understanding. Medications are effective at this time. Supplies by type and quantity ordered this visit include: Chux, wipes, zinc, optifoam for heel, booties Order Number : [id number]    Consulted medical director/attending physician regarding: Not needed    Instructed patient/family/caregiver on 24-hour hospice availability and phone number.     Plan for next visit:  Continued EOL care and family support

## 2023-02-10 ENCOUNTER — HOME CARE VISIT (OUTPATIENT)
Age: 88
End: 2023-02-10
Payer: MEDICARE

## 2023-02-10 PROCEDURE — G0156 HHCP-SVS OF AIDE,EA 15 MIN: HCPCS

## 2023-02-14 ENCOUNTER — HOME CARE VISIT (OUTPATIENT)
Age: 88
End: 2023-02-14
Payer: MEDICARE

## 2023-02-14 PROCEDURE — G0156 HHCP-SVS OF AIDE,EA 15 MIN: HCPCS

## 2023-02-15 ENCOUNTER — HOME CARE VISIT (OUTPATIENT)
Age: 88
End: 2023-02-15
Payer: MEDICARE

## 2023-02-15 VITALS
RESPIRATION RATE: 16 BRPM | HEART RATE: 71 BPM | DIASTOLIC BLOOD PRESSURE: 66 MMHG | OXYGEN SATURATION: 98 % | SYSTOLIC BLOOD PRESSURE: 112 MMHG

## 2023-02-15 PROCEDURE — G0299 HHS/HOSPICE OF RN EA 15 MIN: HCPCS

## 2023-02-15 ASSESSMENT — ENCOUNTER SYMPTOMS
CONSTIPATION: 1
TROUBLE SWALLOWING: 1

## 2023-02-16 ENCOUNTER — HOME CARE VISIT (OUTPATIENT)
Age: 88
End: 2023-02-16
Payer: MEDICARE

## 2023-02-16 NOTE — HOSPICE
The following standard precautions for infection control were utilized:  Mask  Patient was in bed with  by her side. Patient seems to be losing weight and family seems to be doing well. Will continue to follow.

## 2023-02-16 NOTE — HOSPICE
Patient awake and alert, sitting up in bed. Patient will answer to her name, but will not tell you what her name is and patient changes the subject when asked when her birthday is. Patient denies pain and discomfort. Patient behavior indicators negative as well. Patient is pleasant and chatty today. Daughter who is in the home informs this nurse that she \"gave her the medication (Haldol). \"  Patient attempts to have a conversation, most times not within context. Patient will randomly laugh as well. Patient does well as writer and Elroy Jimenez RN roll her to assess her skin and situate her in the bed. Per , patient LBM was on Sunday and the grandson gave her a suppository. Patient catheter is draining malodorous, tea colored urine. Patient catheter bag and tubing is stained purple. Patient left heel wound has a stringy discharge and a foul odor. Discussed with family about contacting NP to obtain an order for crushed Flagyl for the wound bed. Both daughter and spouse verbalize understanding. Per spouse, patient is eating \"good. \"  When asked he informs this nurse that the patient has been sleeping \"good. \"  He states the last few nights, \"real good. She slept through the night. \"  Writer asked  if patient was still napping throughout the day.  affirmed that patient is still taking naps throughout the day. Per conversation with grandson, no medication or supplies needed. Verified with spouse in the home. He informed this nurse \"my grandson handles that. \"

## 2023-02-17 ENCOUNTER — HOME CARE VISIT (OUTPATIENT)
Age: 88
End: 2023-02-17
Payer: MEDICARE

## 2023-02-17 PROCEDURE — G0156 HHCP-SVS OF AIDE,EA 15 MIN: HCPCS

## 2023-02-21 ENCOUNTER — HOME CARE VISIT (OUTPATIENT)
Age: 88
End: 2023-02-21
Payer: MEDICARE

## 2023-02-21 PROCEDURE — G0156 HHCP-SVS OF AIDE,EA 15 MIN: HCPCS

## 2023-02-23 ENCOUNTER — HOME CARE VISIT (OUTPATIENT)
Age: 88
End: 2023-02-23
Payer: MEDICARE

## 2023-02-23 ENCOUNTER — TELEPHONE (OUTPATIENT)
Facility: CLINIC | Age: 88
End: 2023-02-23

## 2023-02-23 VITALS
TEMPERATURE: 95.6 F | HEART RATE: 90 BPM | SYSTOLIC BLOOD PRESSURE: 150 MMHG | OXYGEN SATURATION: 95 % | DIASTOLIC BLOOD PRESSURE: 92 MMHG | RESPIRATION RATE: 14 BRPM

## 2023-02-23 DIAGNOSIS — E11.29 TYPE 2 DIABETES MELLITUS WITH OTHER DIABETIC KIDNEY COMPLICATION (HCC): Primary | ICD-10-CM

## 2023-02-23 PROCEDURE — G0156 HHCP-SVS OF AIDE,EA 15 MIN: HCPCS

## 2023-02-23 PROCEDURE — G0299 HHS/HOSPICE OF RN EA 15 MIN: HCPCS

## 2023-02-23 RX ORDER — FLASH GLUCOSE SENSOR
KIT MISCELLANEOUS
COMMUNITY
End: 2023-02-23 | Stop reason: SDUPTHER

## 2023-02-23 RX ORDER — FLASH GLUCOSE SENSOR
1 KIT MISCELLANEOUS 3 TIMES DAILY PRN
Qty: 6 EACH | Refills: 0 | Status: SHIPPED | OUTPATIENT
Start: 2023-02-23

## 2023-02-23 ASSESSMENT — ENCOUNTER SYMPTOMS: PAIN LOCATION - PAIN QUALITY: UNABLE TO DESCRIBE

## 2023-02-23 NOTE — HOSPICE
Medication refills ordered this visit: N/a    Medications reconciled and all medications are available in the home this visit. The following education was provided regarding medications, medication interactions, and look alike medications: Morphine was discussed over the phone with Grandson. verbalize understanding. Medications are not effective, to control pain. Supplies by type and quantity ordered this visit include: N/A    Consulted medical director/attending physician regarding: N/A    Instructed patient/family/caregiver on 24-hour hospice availability and phone number. Plan for next visit: Wound care for next week.

## 2023-02-23 NOTE — TELEPHONE ENCOUNTER
Called Patient's pharmacy. Last time I see this ordered was 12/16/2022, however, I do not see it was ordered by Dr Nikhil Lawrence. I also see in patient connect care chart it looks like Mal was removed from medication list in November 2022. Padilla s states written by Dr Nikhil Lawrence 4/18/2022 for 9 month supply. Patient does need refill.  Added back to med list via reconciliation tab and refill sent per verbal order from Anahi Bagley NP.

## 2023-02-23 NOTE — TELEPHONE ENCOUNTER
----- Message from Hardy Dia sent at 2/23/2023  4:01 PM EST -----  Regarding: Refill on Freestyle Richard 14day Sensor  Hi Cuco Painter,    Dr. Altagracia Li has ordered this medication in the past and I have also [facility] [facility] to contact the office for a medication renewal request.     Thanks,  Dr. Maikol Rodríguez

## 2023-02-24 ASSESSMENT — ENCOUNTER SYMPTOMS: CONSTIPATION: 1

## 2023-02-27 ENCOUNTER — HOME CARE VISIT (OUTPATIENT)
Age: 88
End: 2023-02-27
Payer: MEDICARE

## 2023-02-28 ENCOUNTER — HOME CARE VISIT (OUTPATIENT)
Age: 88
End: 2023-02-28
Payer: MEDICARE

## 2023-02-28 PROCEDURE — G0156 HHCP-SVS OF AIDE,EA 15 MIN: HCPCS

## 2023-03-01 ENCOUNTER — HOME CARE VISIT (OUTPATIENT)
Age: 88
End: 2023-03-01
Payer: MEDICARE

## 2023-03-01 PROCEDURE — G0299 HHS/HOSPICE OF RN EA 15 MIN: HCPCS

## 2023-03-02 VITALS
TEMPERATURE: 97.9 F | OXYGEN SATURATION: 99 % | DIASTOLIC BLOOD PRESSURE: 67 MMHG | HEART RATE: 77 BPM | SYSTOLIC BLOOD PRESSURE: 120 MMHG | RESPIRATION RATE: 16 BRPM

## 2023-03-02 ASSESSMENT — ENCOUNTER SYMPTOMS: BOWEL INCONTINENCE: 1

## 2023-03-03 ENCOUNTER — HOME CARE VISIT (OUTPATIENT)
Age: 88
End: 2023-03-03
Payer: MEDICARE

## 2023-03-03 PROCEDURE — G0155 HHCP-SVS OF CSW,EA 15 MIN: HCPCS

## 2023-03-03 PROCEDURE — G0156 HHCP-SVS OF AIDE,EA 15 MIN: HCPCS

## 2023-03-03 NOTE — HOSPICE
100% of 5oz dinner. 2 packs of oatmeal or grits. 1-2 Boost per day. Haldol 4mg given yesterday per spouse took a while for the medicatoin to work. Discussed with family about possibly using Lorazepam instead. Sleeps through the night. Naps off and on through out the day.

## 2023-03-03 NOTE — HOSPICE
Reason for today's visit is to assess supportive and FMLA needs. New Sow calls this SW back to complete visit. Liz Rausch states that he is doing well in overseeing pt's care and states he no longer needs FMLA because he is working more from home now. Liz Rausch states pt now is experiencing hallucinations prior to bedtime. ORTIZ provides education on hallucinations during EOL and Goyo verbalizes understanding. Liz Rausch states pt's appetite remains good and pain is controlled with OTC med's. Liz Rausch agrees for SW to continue to follow each month to assess supportive needs and provide support verbalized. ORTIZ provides active listening and support and will continue to follow.

## 2023-03-07 ENCOUNTER — HOME CARE VISIT (OUTPATIENT)
Age: 88
End: 2023-03-07
Payer: MEDICARE

## 2023-03-07 PROCEDURE — G0156 HHCP-SVS OF AIDE,EA 15 MIN: HCPCS

## 2023-03-08 ENCOUNTER — HOME CARE VISIT (OUTPATIENT)
Age: 88
End: 2023-03-08
Payer: MEDICARE

## 2023-03-08 VITALS
OXYGEN SATURATION: 98 % | DIASTOLIC BLOOD PRESSURE: 60 MMHG | SYSTOLIC BLOOD PRESSURE: 128 MMHG | RESPIRATION RATE: 13 BRPM | TEMPERATURE: 97.4 F | HEART RATE: 73 BPM

## 2023-03-08 PROCEDURE — G0299 HHS/HOSPICE OF RN EA 15 MIN: HCPCS

## 2023-03-08 ASSESSMENT — ENCOUNTER SYMPTOMS
CONSTIPATION: 1
HEMOPTYSIS: 0

## 2023-03-08 NOTE — HOSPICE
Arrived on cene, found Patient lying in her bed, with some family around. Patient did not seem to be in any distress, Took Patients vitals, all wer wnl. Looked at supplies, founfd that they were adequate. Caregiver stated that they did not have any needs at this time. Pt eats about 2 meals a day, and also some ensure for a supplement. No other problems presented themselves. WE answered any questions, then left.

## 2023-03-10 ENCOUNTER — HOME CARE VISIT (OUTPATIENT)
Age: 88
End: 2023-03-10
Payer: MEDICARE

## 2023-03-10 PROCEDURE — G0156 HHCP-SVS OF AIDE,EA 15 MIN: HCPCS

## 2023-03-10 PROCEDURE — G0299 HHS/HOSPICE OF RN EA 15 MIN: HCPCS

## 2023-03-12 NOTE — HOSPICE
Writer [facility]a Fonseca CNA to change patient catheter. Patient catheter removed and new 16Fr catheter with 10mL balloon inserted with no difficulties.

## 2023-03-13 ENCOUNTER — HOME CARE VISIT (OUTPATIENT)
Age: 88
End: 2023-03-13
Payer: MEDICARE

## 2023-03-14 ENCOUNTER — HOME CARE VISIT (OUTPATIENT)
Age: 88
End: 2023-03-14
Payer: MEDICARE

## 2023-03-14 PROCEDURE — G0156 HHCP-SVS OF AIDE,EA 15 MIN: HCPCS

## 2023-03-15 ENCOUNTER — HOME CARE VISIT (OUTPATIENT)
Age: 88
End: 2023-03-15
Payer: MEDICARE

## 2023-03-15 PROCEDURE — G0299 HHS/HOSPICE OF RN EA 15 MIN: HCPCS

## 2023-03-16 VITALS
OXYGEN SATURATION: 95 % | RESPIRATION RATE: 16 BRPM | SYSTOLIC BLOOD PRESSURE: 106 MMHG | DIASTOLIC BLOOD PRESSURE: 53 MMHG | HEART RATE: 73 BPM | TEMPERATURE: 98.8 F

## 2023-03-16 ASSESSMENT — ENCOUNTER SYMPTOMS
CONSTIPATION: 1
BOWEL INCONTINENCE: 1
TROUBLE SWALLOWING: 1

## 2023-03-16 NOTE — HOSPICE
Patient resting in bed in no apparent distress watching television with her . When writer greets patient, she states \"oh hi.\"  When asked if she is in pain, patient states, \"I don't know. \"  Patient behavior indicators negative for pain and discomfort. Writer inquired of patient  if patient has had any pain medications.  states that she has not, but she has \"had the other one (Haldol). \"  When asked,  states patient LBM:  3 days ago. Patient has active bowel sounds all four quadrants, abdomen soft and non-tender. \"  Sleeping well per , sleeps through the night and naps off and on during the day. Per , patient is eating two small meals a day. SPouse states that she is \"now drinking through the straw now. \"      When writer moved bed out, patient asked this nurse, \"Oh girl, now why did you do that? \" Writer infomred her that this nurse has to move the bed to conduct her assessment. She stated, \"Oh lord. \"  Patient pleasant and cooperative during assessment. Patient asked this nurse, where did he come from. Writer asked her who she was talking about. She states the dog. Writer informed her that this nurse did not see the dog. Medication Refills needed for: Ativan Pills, Flagyl and Haloperidol. Ordered through [facility] for mail delivery. Confirmation #: [id number]    Dominic Saleem denies any supply needs. Writer informed Dominic Saleem that he will be getting a new  next week. Informed him that CM is a great nurse and that he will still be provided the same care and can call 24hour number whenever needed. He asked this nurse if his HHA would be changing. Writer informed him that he would still have the same HHA. Marco verbalized understanding.

## 2023-03-17 ENCOUNTER — HOME CARE VISIT (OUTPATIENT)
Age: 88
End: 2023-03-17
Payer: MEDICARE

## 2023-03-17 PROCEDURE — G0156 HHCP-SVS OF AIDE,EA 15 MIN: HCPCS

## 2023-03-21 ENCOUNTER — HOME CARE VISIT (OUTPATIENT)
Age: 88
End: 2023-03-21
Payer: MEDICARE

## 2023-03-21 PROCEDURE — G0156 HHCP-SVS OF AIDE,EA 15 MIN: HCPCS

## 2023-03-22 ENCOUNTER — HOME CARE VISIT (OUTPATIENT)
Age: 88
End: 2023-03-22
Payer: MEDICARE

## 2023-03-22 PROCEDURE — G0300 HHS/HOSPICE OF LPN EA 15 MIN: HCPCS

## 2023-03-23 VITALS
TEMPERATURE: 98 F | OXYGEN SATURATION: 96 % | SYSTOLIC BLOOD PRESSURE: 129 MMHG | RESPIRATION RATE: 18 BRPM | HEART RATE: 72 BPM | DIASTOLIC BLOOD PRESSURE: 72 MMHG

## 2023-03-23 ASSESSMENT — ENCOUNTER SYMPTOMS: CONSTIPATION: 1

## 2023-03-23 NOTE — HOSPICE
No new concerns per caregiver. No needs at this time. Medication refills ordered this visit: n/a    Medications reconciled and all medications are available in the home this visit. The following education was provided regarding medications, medication interactions, and look alike medications: Medication side effects, dosages, purposes, frequencies. Response to teaching: Verbalized uderstanding. Medications are effective at this time. Supplies by type and quantity ordered this visit include: None    Consulted medical director/attending physician regarding: Not needed    Instructed patient/family/caregiver on 24-hour hospice availability and phone number. Plan for next visit:  Continue end of life education and support caregiver.

## 2023-03-24 ENCOUNTER — HOME CARE VISIT (OUTPATIENT)
Age: 88
End: 2023-03-24
Payer: MEDICARE

## 2023-03-24 PROCEDURE — G0156 HHCP-SVS OF AIDE,EA 15 MIN: HCPCS

## 2023-03-28 ENCOUNTER — HOME CARE VISIT (OUTPATIENT)
Age: 88
End: 2023-03-28
Payer: MEDICARE

## 2023-03-28 PROCEDURE — G0156 HHCP-SVS OF AIDE,EA 15 MIN: HCPCS

## 2023-03-29 ENCOUNTER — HOME CARE VISIT (OUTPATIENT)
Age: 88
End: 2023-03-29
Payer: MEDICARE

## 2023-03-29 VITALS
DIASTOLIC BLOOD PRESSURE: 78 MMHG | SYSTOLIC BLOOD PRESSURE: 133 MMHG | TEMPERATURE: 98.4 F | HEART RATE: 88 BPM | RESPIRATION RATE: 18 BRPM | OXYGEN SATURATION: 97 %

## 2023-03-29 PROCEDURE — G0299 HHS/HOSPICE OF RN EA 15 MIN: HCPCS

## 2023-03-29 ASSESSMENT — ENCOUNTER SYMPTOMS: BOWEL INCONTINENCE: 1

## 2023-03-31 ENCOUNTER — HOME CARE VISIT (OUTPATIENT)
Age: 88
End: 2023-03-31
Payer: MEDICARE

## 2023-03-31 PROCEDURE — G0156 HHCP-SVS OF AIDE,EA 15 MIN: HCPCS

## 2023-04-04 ENCOUNTER — HOME CARE VISIT (OUTPATIENT)
Age: 88
End: 2023-04-04
Payer: MEDICARE

## 2023-04-04 PROCEDURE — G0156 HHCP-SVS OF AIDE,EA 15 MIN: HCPCS

## 2023-04-05 ENCOUNTER — HOME CARE VISIT (OUTPATIENT)
Age: 88
End: 2023-04-05
Payer: MEDICARE

## 2023-04-05 VITALS
OXYGEN SATURATION: 100 % | DIASTOLIC BLOOD PRESSURE: 62 MMHG | SYSTOLIC BLOOD PRESSURE: 148 MMHG | HEART RATE: 92 BPM | RESPIRATION RATE: 20 BRPM | TEMPERATURE: 97.1 F

## 2023-04-05 PROCEDURE — G0299 HHS/HOSPICE OF RN EA 15 MIN: HCPCS

## 2023-04-05 ASSESSMENT — ENCOUNTER SYMPTOMS: BOWEL INCONTINENCE: 1

## 2023-04-05 NOTE — HOSPICE
The following standard precautions for infection control were utilized:  mask  Patient was in bed resting so her caregiver () and I had a chance to talk more. He is dealing with this ok. He mentioned that he has some days but overall he is well.  will continue to follow.

## 2023-04-05 NOTE — HOSPICE
Medication refills ordered this visit: Flagyl refilled. confirmation # [id number]    Medications reconciled and all medications are available in the home this visit. Education was provided regarding medications, medication interactions, and look alike medications. Response to teaching:   verbalizes understanding. Medications are effective at this time. Supplies by type and quantity ordered this visit include: small wedge, antifungal cream an powder, heel protectors. Order Number : [id number]    Consulted medical director/attending physician regarding: Not at this time    Instructed patient/family/caregiver on 24-hour hospice availability and phone number.     Plan for next visit:  Continued EOL and support

## 2023-04-07 ENCOUNTER — HOME CARE VISIT (OUTPATIENT)
Age: 88
End: 2023-04-07
Payer: MEDICARE

## 2023-04-07 PROCEDURE — G0156 HHCP-SVS OF AIDE,EA 15 MIN: HCPCS

## 2023-04-18 ENCOUNTER — HOME CARE VISIT (OUTPATIENT)
Age: 88
End: 2023-04-18
Payer: MEDICARE

## 2023-04-18 PROCEDURE — G0156 HHCP-SVS OF AIDE,EA 15 MIN: HCPCS

## 2023-04-19 ENCOUNTER — HOME CARE VISIT (OUTPATIENT)
Age: 88
End: 2023-04-19
Payer: MEDICARE

## 2023-04-19 VITALS
HEART RATE: 60 BPM | SYSTOLIC BLOOD PRESSURE: 138 MMHG | TEMPERATURE: 98.2 F | DIASTOLIC BLOOD PRESSURE: 81 MMHG | OXYGEN SATURATION: 100 % | RESPIRATION RATE: 18 BRPM

## 2023-04-19 PROCEDURE — G0299 HHS/HOSPICE OF RN EA 15 MIN: HCPCS

## 2023-04-19 ASSESSMENT — ENCOUNTER SYMPTOMS: BOWEL INCONTINENCE: 1

## 2023-04-19 NOTE — HOSPICE
Medication refills ordered this visit: Flagyl refilled  confirmation #[id number]    Medications reconciled and all medications are available in the home this visit. Education was provided regarding medications, medication interactions, and look alike medications. Response to teaching:   verbalizes undersanding. Medications are effective at this time. Supplies by type and quantity ordered this visit include: chux, wipes, antifungal cream, barrier cream, 3xl briefs, DWC, 4x4's and calazime  confirmation # [id number]    Consulted medical director/attending physician regarding: Not this visit    Instructed patient/family/caregiver on 24-hour hospice availability and phone number.     Plan for next visit:  Continued EOL education and support

## 2023-04-21 ENCOUNTER — HOME CARE VISIT (OUTPATIENT)
Age: 88
End: 2023-04-21
Payer: MEDICARE

## 2023-04-21 PROCEDURE — G0156 HHCP-SVS OF AIDE,EA 15 MIN: HCPCS

## 2023-04-25 ENCOUNTER — HOME CARE VISIT (OUTPATIENT)
Age: 88
End: 2023-04-25
Payer: MEDICARE

## 2023-04-25 PROCEDURE — G0156 HHCP-SVS OF AIDE,EA 15 MIN: HCPCS

## 2023-04-26 ENCOUNTER — HOME CARE VISIT (OUTPATIENT)
Age: 88
End: 2023-04-26
Payer: MEDICARE

## 2023-04-26 PROCEDURE — G0299 HHS/HOSPICE OF RN EA 15 MIN: HCPCS

## 2023-04-27 VITALS
DIASTOLIC BLOOD PRESSURE: 58 MMHG | OXYGEN SATURATION: 92 % | RESPIRATION RATE: 16 BRPM | SYSTOLIC BLOOD PRESSURE: 168 MMHG | HEART RATE: 84 BPM | TEMPERATURE: 97.5 F

## 2023-04-27 NOTE — HOSPICE
Pt lying in bed in NAD. No physical evidence of pain or anxiety. Minimally responsive to questions. Lungs clear. Wound care performed (see addendum). Medication refills ordered this visit: None    Medications reconciled and all medications are available in the home. No med instruction this visit. Medications effective at this time    Supplies by type and quantity ordered this visit include: Gloves/underpads. Medline order #[id number]    Consulted medical director/attending physician regarding: N/A    Instructed patient/family/caregiver on 24-hour hospice availability and phone number. Plan for next visit:  Wound care, assess and manage sx.

## 2023-04-28 ENCOUNTER — HOME CARE VISIT (OUTPATIENT)
Age: 88
End: 2023-04-28
Payer: MEDICARE

## 2023-04-28 PROCEDURE — G0156 HHCP-SVS OF AIDE,EA 15 MIN: HCPCS

## 2023-05-02 ENCOUNTER — HOME CARE VISIT (OUTPATIENT)
Age: 88
End: 2023-05-02
Payer: MEDICARE

## 2023-05-02 PROCEDURE — G0156 HHCP-SVS OF AIDE,EA 15 MIN: HCPCS

## 2023-05-03 ENCOUNTER — HOME CARE VISIT (OUTPATIENT)
Age: 88
End: 2023-05-03
Payer: MEDICARE

## 2023-05-03 PROCEDURE — G0299 HHS/HOSPICE OF RN EA 15 MIN: HCPCS

## 2023-05-04 VITALS
DIASTOLIC BLOOD PRESSURE: 118 MMHG | OXYGEN SATURATION: 98 % | TEMPERATURE: 97.2 F | RESPIRATION RATE: 16 BRPM | HEART RATE: 80 BPM | SYSTOLIC BLOOD PRESSURE: 189 MMHG

## 2023-05-04 ASSESSMENT — ENCOUNTER SYMPTOMS: BOWEL INCONTINENCE: 1

## 2023-05-04 NOTE — HOSPICE
Medication refills ordered this visit: None    Medications reconciled and all medications are in the home this visit. Medications are effective at this time. Supplies by type and quantity ordered this visit include: None requested    Consulted medical director/attending physician regarding: N/A    Instructed patient/family/caregiver on 24-hour hospice availability and phone number. Plan for next visit:  Continue to montor and manage sx.

## 2023-05-05 ENCOUNTER — HOME CARE VISIT (OUTPATIENT)
Age: 88
End: 2023-05-05
Payer: MEDICARE

## 2023-05-05 PROCEDURE — G0156 HHCP-SVS OF AIDE,EA 15 MIN: HCPCS

## 2023-05-09 ENCOUNTER — HOME CARE VISIT (OUTPATIENT)
Age: 88
End: 2023-05-09
Payer: MEDICARE

## 2023-05-09 PROCEDURE — G0156 HHCP-SVS OF AIDE,EA 15 MIN: HCPCS

## 2023-05-10 ENCOUNTER — HOME CARE VISIT (OUTPATIENT)
Age: 88
End: 2023-05-10
Payer: MEDICARE

## 2023-05-10 VITALS
TEMPERATURE: 98.9 F | SYSTOLIC BLOOD PRESSURE: 139 MMHG | OXYGEN SATURATION: 95 % | RESPIRATION RATE: 18 BRPM | HEART RATE: 71 BPM | DIASTOLIC BLOOD PRESSURE: 78 MMHG

## 2023-05-10 PROCEDURE — G0300 HHS/HOSPICE OF LPN EA 15 MIN: HCPCS

## 2023-05-10 ASSESSMENT — ENCOUNTER SYMPTOMS: BOWEL INCONTINENCE: 1

## 2023-05-11 NOTE — HOSPICE
Patient presents in bed quietly moaning. Patients daughter is present and states that the patient was medicated prior to this visit. Catheter was changed with the assistance of a second nurse and nurse aide. 16Fr catheter placed with 10mL balloon. Positive urine flow noted. NP consulted to increase Lorazepam dose to 1mLQ8H PRN with a max dose of 10mg daily for increased agitation. POA/Grandson called and made aware of dosage change and he is in agreement. Medication refills ordered this visit: None needed    Medications reconciled and all medications are available in the home this visit. The following education was provided regarding medications, medication interactions, and look alike medications: Medication side effects, dosages, purposes, frequencies. Response to teaching: Verbalized understanding. Medications are effective at this time. Supplies by type and quantity ordered this visit include: None needed    Consulted medical director/attending physician regarding: Not needed this visit    Instructed patient/family/caregiver on 24-hour hospice availability and phone number. Plan for next visit:  Continue end of life education and support caregiver.

## 2023-05-12 ENCOUNTER — HOME CARE VISIT (OUTPATIENT)
Age: 88
End: 2023-05-12
Payer: MEDICARE

## 2023-05-12 PROCEDURE — G0299 HHS/HOSPICE OF RN EA 15 MIN: HCPCS

## 2023-05-12 PROCEDURE — G0156 HHCP-SVS OF AIDE,EA 15 MIN: HCPCS

## 2023-05-12 NOTE — HOSPICE
Medication refills ordered this visit: No refills needed    Medications reconciled and all medications are available in the home this visit. Education was provided regarding medications, medication interactions, and look alike medications. Response to teaching. Family verbalized an understanding about the use of pyridium. Aware that the medication will chnge the urine to an orange color. Medications are effective at this time. Supplies by type and quantity ordered this visit include:  Not at this time    Consulted medical director/attending physician regarding:  Spoke with [facility] about ordering pyridium. Escript sent to her    Instructed patient/family/caregiver on 24-hour hospice availability and phone number.     Plan for next visit:  Catheter change on Monday if campos continues to leak

## 2023-05-15 NOTE — HOSPICE
Medication refills ordered this visit[facility]    Medications reconciled and all medications are available in the home this visit. Education was provided regarding medications, medication interactions, and look alike medications. Response to teaching. Marco verbalized an understanding. Medications are effective at this time. Supplies by type and quantity ordered this visit include: barrier cream, antifungal cream, wipes, chux and XL gloves confirmation # [id number]    Consulted medical director/attending physician regarding: Radha Dwyer NP to send escipt    Instructed patient/family/caregiver on 24-hour hospice availability and phone number.     Plan for next visit:  Continued EOL education and support

## 2023-05-17 NOTE — HOSPICE
Reason for today's visit is to assess emotional support needs and provide support verbalized. MSW places call and speaks to grandson caregiver  (CG) Goyo. CG states pt stopped talking last week and stopped eating this week. CG states pt is now on oxygen. CG states he educated spouse that pt is most likely aspirating and he feels she has aspiration pneumonia, possibly from force feeding. CG reports that pt's spouse seems to be in denial along with some other family members. MSW offers to provide support to family members, and CG states he will talk with them first and let hospice know if that support is needed. CG states pt is comfortable right now and he did receive the suction machine that was ordered. CG reports that he is doing well emotionally, as he is an MD himself and understands hospice philosophy. CG agrees for MSW to continue to follow for supportive needs each month, and MSW educates that a PRN visit can be made for family if needed, CG verbalizes understanding.

## 2023-05-17 NOTE — HOSPICE
Patient was received in bed in high robin's position, obvious signs of respiratory distress observed. She was medicated with Lorazeam prior to visit. Morphine administered by family member, Hyoscyamine administered. Suction  for PRN use. Oxygen concentrator ordered to be delivered. Oxygen sat maintained at 88-90% duing visit. Patient left comfortable. Care continues.

## 2023-05-17 NOTE — HOSPICE
The following standard precautions for infection control were utilized:  Mask  Patient was in bed and family is by her side. They are doing ok with the change of condition we see.  will continue to follow.

## 2023-05-19 NOTE — HOSPICE
Medication refills ordered this visit:   No refills neede this visit    Medications reconciled and all medications are in the home this visit. Education was provided regarding medications, medication interactions, and look alike medications. Response to teaching. Grandson and daughter verbalized understanding of medication administration. Medications are effective at this time. Supplies by type and quantity ordered this visit include: None needed    Consulted medical director/attending physician regarding:  No at thia time    Instructed patient/family/caregiver on 24-hour hospice availability and phone number.     Plan for next visit:  Visits increased to 3x weekly for contoued EOL education and support

## 2023-08-31 NOTE — HOSPICE
Postmortem care provided to patient. Family actively and willingly participated in care. Patient and families postmortem [employer] and cultural wishes maintained. The following were removed from patient body, catheter and discarded in biohazard bag. Eyes gently closed. HOB raised to 30 degrees. This clinician did stay with family until clergy or  home arrived. Family is appropriately grieving currently. [place]  home picked up remains. Yes

## 2023-11-09 NOTE — HOSPICE
Patient awake and alert sitting up in bed watching television with her . Patient states \"uh huh\" when asked if she is in pain. Patient behavior indicators negative for pain and discomfort as well. Mr. María Ellis denies having to give the patient pain medication. Patient laughs intermittently. Patient on prophylactic antibiotic. Patient urinary catheter upper portion has a blue hue and patino bag has a purple tint. Per  patient urine does not smell \"way out of the way\"     Per , patient is eating 2 meals, breakfast and dinner. He states that midday, she will have some ice cream.  He states that she is drinking \"good\"     Patient chatty today. Patient speaking incoherently. Writer asked her Mrs María Ellis what's wrong. She looked at this nurse like she was crazy and said \"who\". Writer attempted to get her to repeat and she said something about \"being let down. \"  Writer asked her who. Patient stated, \"I don't know. \"  Patient states something to her  about \"home\"  He told her \"you are already home. \"  Patient whining almost as if she were trying to cry.  told her to stop that stuff. Per , patient is sleeping good. When asked if she naps during the day, spouse states, \"Oh yeah, she naps a lot during the day and sleeps all night. \"      When asked,  states that since starting the Seroquel, patient will try and engage with him more. He states that it is all nonsense though. Patient asks spouse several times who this nurse was. Patient looks at  and says \"come on\"   asked her where she wanted to go. Patient states, \"home. \"   assures her that she is at home. Writer asked spouse if she has been talking about home frequently.  denies.  states that she will talk about home every once in a while. Patient states she wants to go home several times.  tells her she is not ready to go home.   The man upstairs hasn't called you home yet. Writer had to ask spouse whether patient was laughing or crying with the noises she was making. He informed this nurse that it is a little bit of both, that is how she tries to cry. He states that she has been doing that more now. When asked,  informs this nurse that Tami Moore came and checked out the bed. Tech said that the wire was loose from the motor. Mr. Craft Gist states that he just taped it. When writer told patient bye and she would see her later, patient said 'uh huh bye\"      Morphine approximatley 16mL in bottle  Haldol approximately 15mL in bottle. (5) syringes with 1mL; (1) syringe with 0.5mL and (3) empty syringes. Spouse denies any supply needs. XL gloves received. [facility] Counseling: I discussed with the patient the risks of tildrakizumab including but not limited to immunosuppression, malignancy, posterior leukoencephalopathy syndrome, and serious infections.  The patient understands that monitoring is required including a PPD at baseline and must alert us or the primary physician if symptoms of infection or other concerning signs are noted.